# Patient Record
Sex: FEMALE | Race: WHITE | Employment: OTHER | URBAN - METROPOLITAN AREA
[De-identification: names, ages, dates, MRNs, and addresses within clinical notes are randomized per-mention and may not be internally consistent; named-entity substitution may affect disease eponyms.]

---

## 2019-05-09 ENCOUNTER — HOSPITAL ENCOUNTER (INPATIENT)
Age: 74
LOS: 3 days | Discharge: HOME HEALTH | DRG: 698 | End: 2019-05-12
Attending: EMERGENCY MEDICINE | Admitting: INTERNAL MEDICINE
Payer: MEDICARE

## 2019-05-09 ENCOUNTER — APPOINTMENT (OUTPATIENT)
Dept: CT IMAGING | Age: 74
DRG: 698 | End: 2019-05-09
Attending: EMERGENCY MEDICINE
Payer: MEDICARE

## 2019-05-09 ENCOUNTER — APPOINTMENT (OUTPATIENT)
Dept: GENERAL RADIOLOGY | Age: 74
DRG: 698 | End: 2019-05-09
Attending: EMERGENCY MEDICINE
Payer: MEDICARE

## 2019-05-09 DIAGNOSIS — N30.01 ACUTE CYSTITIS WITH HEMATURIA: Primary | ICD-10-CM

## 2019-05-09 DIAGNOSIS — R65.20 SEVERE SEPSIS (HCC): ICD-10-CM

## 2019-05-09 DIAGNOSIS — N17.9 AKI (ACUTE KIDNEY INJURY) (HCC): ICD-10-CM

## 2019-05-09 DIAGNOSIS — A41.9 SEVERE SEPSIS (HCC): ICD-10-CM

## 2019-05-09 PROBLEM — E78.5 HYPERLIPIDEMIA: Status: ACTIVE | Noted: 2019-05-09

## 2019-05-09 PROBLEM — E03.9 HYPOTHYROIDISM: Status: ACTIVE | Noted: 2019-05-09

## 2019-05-09 PROBLEM — E87.1 HYPONATREMIA: Status: ACTIVE | Noted: 2019-05-09

## 2019-05-09 PROBLEM — N39.0 UTI (URINARY TRACT INFECTION): Status: ACTIVE | Noted: 2019-05-09

## 2019-05-09 PROBLEM — I10 HTN (HYPERTENSION): Status: ACTIVE | Noted: 2019-05-09

## 2019-05-09 LAB
ALBUMIN SERPL-MCNC: 2.8 G/DL (ref 3.5–5)
ALBUMIN/GLOB SERPL: 0.6 {RATIO} (ref 1.1–2.2)
ALP SERPL-CCNC: 57 U/L (ref 45–117)
ALT SERPL-CCNC: 58 U/L (ref 12–78)
ANION GAP SERPL CALC-SCNC: 12 MMOL/L (ref 5–15)
APPEARANCE UR: ABNORMAL
AST SERPL-CCNC: 80 U/L (ref 15–37)
BACTERIA URNS QL MICRO: ABNORMAL /HPF
BASOPHILS # BLD: 0 K/UL (ref 0–0.1)
BASOPHILS NFR BLD: 0 % (ref 0–1)
BILIRUB SERPL-MCNC: 0.4 MG/DL (ref 0.2–1)
BILIRUB UR QL: NEGATIVE
BUN SERPL-MCNC: 60 MG/DL (ref 6–20)
BUN/CREAT SERPL: 26 (ref 12–20)
CALCIUM SERPL-MCNC: 8.3 MG/DL (ref 8.5–10.1)
CHLORIDE SERPL-SCNC: 95 MMOL/L (ref 97–108)
CO2 SERPL-SCNC: 22 MMOL/L (ref 21–32)
COLOR UR: ABNORMAL
CREAT SERPL-MCNC: 2.3 MG/DL (ref 0.55–1.02)
DIFFERENTIAL METHOD BLD: ABNORMAL
EOSINOPHIL # BLD: 0 K/UL (ref 0–0.4)
EOSINOPHIL NFR BLD: 0 % (ref 0–7)
EPITH CASTS URNS QL MICRO: ABNORMAL /LPF
ERYTHROCYTE [DISTWIDTH] IN BLOOD BY AUTOMATED COUNT: 12 % (ref 11.5–14.5)
GLOBULIN SER CALC-MCNC: 4.5 G/DL (ref 2–4)
GLUCOSE SERPL-MCNC: 99 MG/DL (ref 65–100)
GLUCOSE UR STRIP.AUTO-MCNC: NEGATIVE MG/DL
HCT VFR BLD AUTO: 34.6 % (ref 35–47)
HGB BLD-MCNC: 12.3 G/DL (ref 11.5–16)
HGB UR QL STRIP: ABNORMAL
IMM GRANULOCYTES # BLD AUTO: 0.2 K/UL (ref 0–0.04)
IMM GRANULOCYTES NFR BLD AUTO: 1 % (ref 0–0.5)
KETONES UR QL STRIP.AUTO: NEGATIVE MG/DL
LACTATE BLD-SCNC: 1.08 MMOL/L (ref 0.4–2)
LACTATE SERPL-SCNC: 1.1 MMOL/L (ref 0.4–2)
LEUKOCYTE ESTERASE UR QL STRIP.AUTO: ABNORMAL
LIPASE SERPL-CCNC: 82 U/L (ref 73–393)
LYMPHOCYTES # BLD: 0.6 K/UL (ref 0.8–3.5)
LYMPHOCYTES NFR BLD: 4 % (ref 12–49)
MCH RBC QN AUTO: 32.4 PG (ref 26–34)
MCHC RBC AUTO-ENTMCNC: 35.5 G/DL (ref 30–36.5)
MCV RBC AUTO: 91.1 FL (ref 80–99)
MONOCYTES # BLD: 1.1 K/UL (ref 0–1)
MONOCYTES NFR BLD: 7 % (ref 5–13)
NEUTS SEG # BLD: 13.3 K/UL (ref 1.8–8)
NEUTS SEG NFR BLD: 88 % (ref 32–75)
NITRITE UR QL STRIP.AUTO: NEGATIVE
NRBC # BLD: 0 K/UL (ref 0–0.01)
NRBC BLD-RTO: 0 PER 100 WBC
PH UR STRIP: 5 [PH] (ref 5–8)
PLATELET # BLD AUTO: 246 K/UL (ref 150–400)
PMV BLD AUTO: 10.5 FL (ref 8.9–12.9)
POTASSIUM SERPL-SCNC: 3.7 MMOL/L (ref 3.5–5.1)
PROT SERPL-MCNC: 7.3 G/DL (ref 6.4–8.2)
PROT UR STRIP-MCNC: 100 MG/DL
RBC # BLD AUTO: 3.8 M/UL (ref 3.8–5.2)
RBC #/AREA URNS HPF: ABNORMAL /HPF (ref 0–5)
RBC MORPH BLD: ABNORMAL
SODIUM SERPL-SCNC: 129 MMOL/L (ref 136–145)
SP GR UR REFRACTOMETRY: 1.01 (ref 1–1.03)
UA: UC IF INDICATED,UAUC: ABNORMAL
UROBILINOGEN UR QL STRIP.AUTO: 0.2 EU/DL (ref 0.2–1)
WBC # BLD AUTO: 15.2 K/UL (ref 3.6–11)
WBC URNS QL MICRO: ABNORMAL /HPF (ref 0–4)

## 2019-05-09 PROCEDURE — 65660000000 HC RM CCU STEPDOWN

## 2019-05-09 PROCEDURE — 96361 HYDRATE IV INFUSION ADD-ON: CPT

## 2019-05-09 PROCEDURE — 74011000258 HC RX REV CODE- 258: Performed by: EMERGENCY MEDICINE

## 2019-05-09 PROCEDURE — 83690 ASSAY OF LIPASE: CPT

## 2019-05-09 PROCEDURE — 81001 URINALYSIS AUTO W/SCOPE: CPT

## 2019-05-09 PROCEDURE — 87086 URINE CULTURE/COLONY COUNT: CPT

## 2019-05-09 PROCEDURE — 74011250636 HC RX REV CODE- 250/636: Performed by: INTERNAL MEDICINE

## 2019-05-09 PROCEDURE — 87077 CULTURE AEROBIC IDENTIFY: CPT

## 2019-05-09 PROCEDURE — 74176 CT ABD & PELVIS W/O CONTRAST: CPT

## 2019-05-09 PROCEDURE — 74011250636 HC RX REV CODE- 250/636: Performed by: EMERGENCY MEDICINE

## 2019-05-09 PROCEDURE — 71045 X-RAY EXAM CHEST 1 VIEW: CPT

## 2019-05-09 PROCEDURE — 80053 COMPREHEN METABOLIC PANEL: CPT

## 2019-05-09 PROCEDURE — 74011250637 HC RX REV CODE- 250/637: Performed by: INTERNAL MEDICINE

## 2019-05-09 PROCEDURE — 87186 SC STD MICRODIL/AGAR DIL: CPT

## 2019-05-09 PROCEDURE — 85025 COMPLETE CBC W/AUTO DIFF WBC: CPT

## 2019-05-09 PROCEDURE — 83605 ASSAY OF LACTIC ACID: CPT

## 2019-05-09 PROCEDURE — 36415 COLL VENOUS BLD VENIPUNCTURE: CPT

## 2019-05-09 PROCEDURE — 96365 THER/PROPH/DIAG IV INF INIT: CPT

## 2019-05-09 PROCEDURE — 96375 TX/PRO/DX INJ NEW DRUG ADDON: CPT

## 2019-05-09 PROCEDURE — 99285 EMERGENCY DEPT VISIT HI MDM: CPT

## 2019-05-09 PROCEDURE — 87040 BLOOD CULTURE FOR BACTERIA: CPT

## 2019-05-09 RX ORDER — LISINOPRIL AND HYDROCHLOROTHIAZIDE 12.5; 2 MG/1; MG/1
1 TABLET ORAL DAILY
COMMUNITY
End: 2019-05-12

## 2019-05-09 RX ORDER — SODIUM CHLORIDE 0.9 % (FLUSH) 0.9 %
5-40 SYRINGE (ML) INJECTION EVERY 8 HOURS
Status: DISCONTINUED | OUTPATIENT
Start: 2019-05-09 | End: 2019-05-12 | Stop reason: HOSPADM

## 2019-05-09 RX ORDER — ONDANSETRON 2 MG/ML
4 INJECTION INTRAMUSCULAR; INTRAVENOUS
Status: DISCONTINUED | OUTPATIENT
Start: 2019-05-09 | End: 2019-05-12 | Stop reason: HOSPADM

## 2019-05-09 RX ORDER — ACETAMINOPHEN 325 MG/1
650 TABLET ORAL
Status: DISCONTINUED | OUTPATIENT
Start: 2019-05-09 | End: 2019-05-09

## 2019-05-09 RX ORDER — SODIUM CHLORIDE 9 MG/ML
125 INJECTION, SOLUTION INTRAVENOUS CONTINUOUS
Status: DISCONTINUED | OUTPATIENT
Start: 2019-05-09 | End: 2019-05-12

## 2019-05-09 RX ORDER — LEVOTHYROXINE SODIUM 100 UG/1
100 TABLET ORAL
COMMUNITY

## 2019-05-09 RX ORDER — CEFUROXIME AXETIL 250 MG/1
250 TABLET ORAL 2 TIMES DAILY
COMMUNITY
End: 2019-05-12

## 2019-05-09 RX ORDER — SODIUM CHLORIDE 0.9 % (FLUSH) 0.9 %
5-40 SYRINGE (ML) INJECTION AS NEEDED
Status: DISCONTINUED | OUTPATIENT
Start: 2019-05-09 | End: 2019-05-12 | Stop reason: HOSPADM

## 2019-05-09 RX ORDER — ONDANSETRON 2 MG/ML
4 INJECTION INTRAMUSCULAR; INTRAVENOUS
Status: COMPLETED | OUTPATIENT
Start: 2019-05-09 | End: 2019-05-09

## 2019-05-09 RX ORDER — LISINOPRIL 20 MG/1
TABLET ORAL DAILY
COMMUNITY
End: 2019-05-09 | Stop reason: CLARIF

## 2019-05-09 RX ORDER — SIMVASTATIN 40 MG/1
40 TABLET, FILM COATED ORAL
COMMUNITY

## 2019-05-09 RX ORDER — HEPARIN SODIUM 5000 [USP'U]/ML
5000 INJECTION, SOLUTION INTRAVENOUS; SUBCUTANEOUS EVERY 8 HOURS
Status: DISCONTINUED | OUTPATIENT
Start: 2019-05-09 | End: 2019-05-12 | Stop reason: HOSPADM

## 2019-05-09 RX ORDER — LEVOTHYROXINE SODIUM 100 UG/1
100 TABLET ORAL
Status: DISCONTINUED | OUTPATIENT
Start: 2019-05-10 | End: 2019-05-12 | Stop reason: HOSPADM

## 2019-05-09 RX ORDER — PRAVASTATIN SODIUM 40 MG/1
80 TABLET ORAL
Status: DISCONTINUED | OUTPATIENT
Start: 2019-05-09 | End: 2019-05-12 | Stop reason: HOSPADM

## 2019-05-09 RX ORDER — ACETAMINOPHEN 325 MG/1
650 TABLET ORAL
Status: DISCONTINUED | OUTPATIENT
Start: 2019-05-09 | End: 2019-05-12 | Stop reason: HOSPADM

## 2019-05-09 RX ADMIN — ONDANSETRON 4 MG: 2 INJECTION INTRAMUSCULAR; INTRAVENOUS at 19:48

## 2019-05-09 RX ADMIN — Medication 10 ML: at 22:21

## 2019-05-09 RX ADMIN — SODIUM CHLORIDE 125 ML/HR: 900 INJECTION, SOLUTION INTRAVENOUS at 22:25

## 2019-05-09 RX ADMIN — SODIUM CHLORIDE 851 ML: 900 INJECTION, SOLUTION INTRAVENOUS at 20:24

## 2019-05-09 RX ADMIN — SODIUM CHLORIDE 1000 ML: 900 INJECTION, SOLUTION INTRAVENOUS at 19:49

## 2019-05-09 RX ADMIN — CEFTRIAXONE 1 G: 1 INJECTION, POWDER, FOR SOLUTION INTRAMUSCULAR; INTRAVENOUS at 19:49

## 2019-05-09 RX ADMIN — PRAVASTATIN SODIUM 80 MG: 40 TABLET ORAL at 22:19

## 2019-05-09 RX ADMIN — HEPARIN SODIUM 5000 UNITS: 5000 INJECTION INTRAVENOUS; SUBCUTANEOUS at 22:21

## 2019-05-09 NOTE — ED NOTES
Care assumed of patient @ this time & intro with rounding done, with report from __Adiel & MOISÉS Gunn_________________. Patient resting quietly on stretcher without verbal complaints.

## 2019-05-09 NOTE — ED PROVIDER NOTES
EMERGENCY DEPARTMENT HISTORY AND PHYSICAL EXAM      Date: 5/9/2019  Patient Name: Shanique Davidson  Patient Age and Sex: 75 y.o. female     History of Presenting Illness     Chief Complaint   Patient presents with    Fatigue     pt had bladder surgery on 4/29, had catheter and stent removed on Tuesday, pt has been fatigue, fever off and on, weakness, nausea, vomiting    Nausea    Vomiting       History Provided By: Patient    HPI: Shanique Davidson is a 70-year-old female with a history of bladder cancer status post surgery presenting for fever. Patient states that she had bladder surgery on April 29 and had a catheter and stent placed then. She had the Pa catheter and stent removed 2 days ago. Since yesterday has been having intermittent fevers, the highest 101.7, associated with generalized weakness, nausea, vomiting and poor appetite. Patient states that she has not taken anything for the fevers at home. Denies any abdominal pain, back pain, dysuria, hematuria. She called Dr. Felix Ayoub office who recommended she come to the ER. There are no other complaints, changes, or physical findings at this time. PCP: Other, MD [facility]    No current facility-administered medications on file prior to encounter. Current Outpatient Medications on File Prior to Encounter   Medication Sig Dispense Refill    simvastatin (ZOCOR) 40 mg tablet Take 40 mg by mouth nightly.  levothyroxine (SYNTHROID) 100 mcg tablet Take 100 mcg by mouth Daily (before breakfast).  cefUROXime (CEFTIN) 250 mg tablet Take 250 mg by mouth two (2) times a day.  lisinopril-hydroCHLOROthiazide (PRINZIDE, ZESTORETIC) 20-12.5 mg per tablet Take 1 Tab by mouth daily. Past History     Past Medical History:  No past medical history on file. Past Surgical History:  No past surgical history on file. Family History:  No family history on file.     Social History:  Social History     Tobacco Use    Smoking status: Not on file   Substance Use Topics    Alcohol use: Not on file    Drug use: Not on file       Allergies: Allergies   Allergen Reactions    Sulfa (Sulfonamide Antibiotics) Rash         Review of Systems   Review of Systems   Constitutional: Positive for appetite change, fatigue and fever. Negative for chills. Respiratory: Positive for cough. Negative for shortness of breath. Mild cough few days   Cardiovascular: Negative for chest pain. Gastrointestinal: Positive for nausea and vomiting. Negative for abdominal pain, constipation and diarrhea. Genitourinary: Negative for difficulty urinating, dysuria, flank pain and frequency. Neurological: Negative for weakness and numbness. All other systems reviewed and are negative. Physical Exam   Physical Exam   Constitutional: She is oriented to person, place, and time. She appears well-developed and well-nourished. HENT:   Head: Normocephalic and atraumatic. Very dry membranes   Eyes: Conjunctivae and EOM are normal.   Neck: Normal range of motion. Neck supple. Cardiovascular: Normal rate and regular rhythm. Pulmonary/Chest: Effort normal and breath sounds normal. No respiratory distress. Abdominal: Soft. She exhibits no distension. There is no tenderness. There is no rebound and no guarding. No cva tenderness   Musculoskeletal: Normal range of motion. Neurological: She is alert and oriented to person, place, and time. Skin: Skin is warm and dry. Psychiatric: She has a normal mood and affect. Nursing note and vitals reviewed.        Diagnostic Study Results     Labs -     Recent Results (from the past 12 hour(s))   URINALYSIS W/ REFLEX CULTURE    Collection Time: 05/09/19  7:19 PM   Result Value Ref Range    Color YELLOW/STRAW      Appearance TURBID (A) CLEAR      Specific gravity 1.013 1.003 - 1.030      pH (UA) 5.0 5.0 - 8.0      Protein 100 (A) NEG mg/dL    Glucose NEGATIVE  NEG mg/dL    Ketone NEGATIVE  NEG mg/dL    Bilirubin NEGATIVE  NEG      Blood LARGE (A) NEG      Urobilinogen 0.2 0.2 - 1.0 EU/dL    Nitrites NEGATIVE  NEG      Leukocyte Esterase LARGE (A) NEG      WBC  0 - 4 /hpf    RBC 10-20 0 - 5 /hpf    Epithelial cells MODERATE (A) FEW /lpf    Bacteria 3+ (A) NEG /hpf    UA:UC IF INDICATED URINE CULTURE ORDERED (A) CNI     CBC WITH AUTOMATED DIFF    Collection Time: 05/09/19  7:41 PM   Result Value Ref Range    WBC 15.2 (H) 3.6 - 11.0 K/uL    RBC 3.80 3.80 - 5.20 M/uL    HGB 12.3 11.5 - 16.0 g/dL    HCT 34.6 (L) 35.0 - 47.0 %    MCV 91.1 80.0 - 99.0 FL    MCH 32.4 26.0 - 34.0 PG    MCHC 35.5 30.0 - 36.5 g/dL    RDW 12.0 11.5 - 14.5 %    PLATELET 861 245 - 976 K/uL    MPV 10.5 8.9 - 12.9 FL    NRBC 0.0 0  WBC    ABSOLUTE NRBC 0.00 0.00 - 0.01 K/uL    NEUTROPHILS 88 (H) 32 - 75 %    LYMPHOCYTES 4 (L) 12 - 49 %    MONOCYTES 7 5 - 13 %    EOSINOPHILS 0 0 - 7 %    BASOPHILS 0 0 - 1 %    IMMATURE GRANULOCYTES 1 (H) 0.0 - 0.5 %    ABS. NEUTROPHILS 13.3 (H) 1.8 - 8.0 K/UL    ABS. LYMPHOCYTES 0.6 (L) 0.8 - 3.5 K/UL    ABS. MONOCYTES 1.1 (H) 0.0 - 1.0 K/UL    ABS. EOSINOPHILS 0.0 0.0 - 0.4 K/UL    ABS. BASOPHILS 0.0 0.0 - 0.1 K/UL    ABS. IMM. GRANS. 0.2 (H) 0.00 - 0.04 K/UL    DF AUTOMATED      RBC COMMENTS NORMOCYTIC, NORMOCHROMIC     METABOLIC PANEL, COMPREHENSIVE    Collection Time: 05/09/19  7:41 PM   Result Value Ref Range    Sodium 129 (L) 136 - 145 mmol/L    Potassium 3.7 3.5 - 5.1 mmol/L    Chloride 95 (L) 97 - 108 mmol/L    CO2 22 21 - 32 mmol/L    Anion gap 12 5 - 15 mmol/L    Glucose 99 65 - 100 mg/dL    BUN 60 (H) 6 - 20 MG/DL    Creatinine 2.30 (H) 0.55 - 1.02 MG/DL    BUN/Creatinine ratio 26 (H) 12 - 20      GFR est AA 25 (L) >60 ml/min/1.73m2    GFR est non-AA 21 (L) >60 ml/min/1.73m2    Calcium 8.3 (L) 8.5 - 10.1 MG/DL    Bilirubin, total 0.4 0.2 - 1.0 MG/DL    ALT (SGPT) 58 12 - 78 U/L    AST (SGOT) 80 (H) 15 - 37 U/L    Alk.  phosphatase 57 45 - 117 U/L    Protein, total 7.3 6.4 - 8.2 g/dL    Albumin 2.8 (L) 3.5 - 5.0 g/dL    Globulin 4.5 (H) 2.0 - 4.0 g/dL    A-G Ratio 0.6 (L) 1.1 - 2.2     LIPASE    Collection Time: 05/09/19  7:41 PM   Result Value Ref Range    Lipase 82 73 - 393 U/L   LACTIC ACID    Collection Time: 05/09/19  7:41 PM   Result Value Ref Range    Lactic acid 1.1 0.4 - 2.0 MMOL/L   POC LACTIC ACID    Collection Time: 05/09/19  7:47 PM   Result Value Ref Range    Lactic Acid (POC) 1.08 0.40 - 2.00 mmol/L       Radiologic Studies -   XR CHEST PORT   Final Result   Impression:   No acute cardiopulmonary process. CT ABD PELV WO CONT    (Results Pending)     CT Results  (Last 48 hours)    None        CXR Results  (Last 48 hours)               05/09/19 1927  XR CHEST PORT Final result    Impression:  Impression:   No acute cardiopulmonary process. Narrative:  Chest portable AP       History: Cough and fever       Comparison: None       Findings: The lungs are well expanded. No focal consolidation, pleural   effusion, or pneumothorax. The cardiomediastinal silhouette is unremarkable. The visualized osseous structures are unremarkable. Medical Decision Making   I am the first provider for this patient. I reviewed the vital signs, available nursing notes, past medical history, past surgical history, family history and social history. Vital Signs-Reviewed the patient's vital signs.   Patient Vitals for the past 12 hrs:   Temp Pulse Resp BP SpO2   05/09/19 2100 -- -- -- 104/50 97 %   05/09/19 2045 -- -- -- 102/50 97 %   05/09/19 2038 -- -- -- 101/49 98 %   05/09/19 2030 -- -- -- 101/49 97 %   05/09/19 2000 -- -- -- 95/49 96 %   05/09/19 1945 -- -- -- 91/49 96 %   05/09/19 1930 -- -- -- 93/49 96 %   05/09/19 1920 -- -- -- (!) 87/51 97 %   05/09/19 1902 -- -- -- -- 95 %   05/09/19 1901 -- -- -- 109/52 --   05/09/19 1900 -- 71 16 109/52 97 %   05/09/19 1850 98.2 °F (36.8 °C) 85 16 98/60 97 %       Records Reviewed: Nursing Notes and Old Medical Records    Provider Notes (Medical Decision Making):   Patient presenting for fever since yesterday in addition to nausea, vomiting, fatigue. Noted to be slightly hypotensive here initially with systolics at 98 and improved to 109. Concern for urinary tract infection given her history versus pneumonia given the cough. We will get sepsis work-up though patient is not febrile or tachycardic here and treat with antibiotics as needed. ED Course:   Initial assessment performed. The patients presenting problems have been discussed, and they are in agreement with the care plan formulated and outlined with them. I have encouraged them to ask questions as they arise throughout their visit. ED Course as of May 09 2119   Thu May 09, 2019   [address] Nurse informed me that patient's urine appears cloudy and she is hypotensive. We will give her the 30 cc/kg bolus and given ceftriaxone for likely UTI. [JS]   2101 Spoke with Dr. Isela Cardenas, urology who stated CT is unlikely to show any surgical problem. Advised admitting to hospitalist and Dr. You Hutchison will see her in the morning. [JS]      ED Course User Index  [JS] Dennis Frye MD     9:20 PM - I suspect that this patient has an active infection. 9:20 PM - The patient met criteria for severe sepsis at this time.       PROVIDER SEPSIS PHYSICAL EXAM EVAL  Vital signs reviewed (see nursing documentation for further details):  Vitals:    05/09/19 2030 05/09/19 2038 05/09/19 2045 05/09/19 2100   BP: 101/49 101/49 102/50 104/50   Pulse:       Resp:       Temp:       SpO2: 97% 98% 97% 97%       Cardiac exam:Regular Rate    Pulmonary exam:Normal    Peripheral pulses:Normal    Capillary refill:Normal    Skin exam:pink    Exam performed Eugene Spviey MD      Critical Care Time:   CRITICAL CARE NOTE :    9:20 PM    IMPENDING DETERIORATION -Cardiovascular  ASSOCIATED RISK FACTORS - Hypotension and Shock  MANAGEMENT- Bedside Assessment and Supervision of Care  INTERPRETATION -  CT Scan, ECG, Blood Pressure and Cardiac Output Measures   INTERVENTIONS - hemodynamic mngmt  CASE REVIEW - Hospitalist, Medical Sub-Specialist, Nursing and Family  TREATMENT RESPONSE -Improved  PERFORMED BY - Self    NOTES   :    I have spent 50 minutes of critical care time involved in lab review, consultations with specialist, family decision- making, bedside attention and documentation. During this entire length of time I was immediately available to the patient . Disposition:    Admission Note:  Patient is being admitted to the hospital by Dr. Meaghan Meneses, Service: Hospitalist.  The results of their tests and reasons for their admission have been discussed with them and available family. They convey agreement and understanding for the need to be admitted and for their admission diagnosis. PLAN:  1. Current Discharge Medication List        2. Follow-up Information    None       3. Return to ED if worse     Diagnosis     Clinical Impression:   1. Acute cystitis with hematuria    2. Severe sepsis ([address]    3. URVASHI (acute kidney injury) ([facility] [address]        Attestations:    Carol Guevara M.D. Please note that this dictation was completed with [employer], the computer voice recognition software. Quite often unanticipated grammatical, syntax, homophones, and other interpretive errors are inadvertently transcribed by the computer software. Please disregard these errors. Please excuse any errors that have escaped final proofreading. Thank you.

## 2019-05-09 NOTE — ED NOTES
edside shift change report given to Bev(oncoming nurse) by Hailey Galicia (offgoing nurse). Report included the following information SBAR, Kardex and MAR. Night RN aware that LA and PBC need to be drawn.

## 2019-05-10 LAB
ALBUMIN SERPL-MCNC: 2.3 G/DL (ref 3.5–5)
ALBUMIN/GLOB SERPL: 0.6 {RATIO} (ref 1.1–2.2)
ALP SERPL-CCNC: 49 U/L (ref 45–117)
ALT SERPL-CCNC: 91 U/L (ref 12–78)
ANION GAP SERPL CALC-SCNC: 8 MMOL/L (ref 5–15)
AST SERPL-CCNC: 122 U/L (ref 15–37)
BASOPHILS # BLD: 0 K/UL (ref 0–0.1)
BASOPHILS NFR BLD: 0 % (ref 0–1)
BILIRUB SERPL-MCNC: 0.3 MG/DL (ref 0.2–1)
BUN SERPL-MCNC: 52 MG/DL (ref 6–20)
BUN/CREAT SERPL: 31 (ref 12–20)
CALCIUM SERPL-MCNC: 7.5 MG/DL (ref 8.5–10.1)
CHLORIDE SERPL-SCNC: 108 MMOL/L (ref 97–108)
CO2 SERPL-SCNC: 20 MMOL/L (ref 21–32)
CREAT SERPL-MCNC: 1.69 MG/DL (ref 0.55–1.02)
DIFFERENTIAL METHOD BLD: ABNORMAL
EOSINOPHIL # BLD: 0 K/UL (ref 0–0.4)
EOSINOPHIL NFR BLD: 0 % (ref 0–7)
ERYTHROCYTE [DISTWIDTH] IN BLOOD BY AUTOMATED COUNT: 12 % (ref 11.5–14.5)
GLOBULIN SER CALC-MCNC: 3.9 G/DL (ref 2–4)
GLUCOSE SERPL-MCNC: 117 MG/DL (ref 65–100)
HCT VFR BLD AUTO: 33.1 % (ref 35–47)
HGB BLD-MCNC: 11.3 G/DL (ref 11.5–16)
IMM GRANULOCYTES # BLD AUTO: 0.1 K/UL (ref 0–0.04)
IMM GRANULOCYTES NFR BLD AUTO: 1 % (ref 0–0.5)
LYMPHOCYTES # BLD: 0.6 K/UL (ref 0.8–3.5)
LYMPHOCYTES NFR BLD: 5 % (ref 12–49)
MCH RBC QN AUTO: 31.9 PG (ref 26–34)
MCHC RBC AUTO-ENTMCNC: 34.1 G/DL (ref 30–36.5)
MCV RBC AUTO: 93.5 FL (ref 80–99)
MONOCYTES # BLD: 1.2 K/UL (ref 0–1)
MONOCYTES NFR BLD: 9 % (ref 5–13)
NEUTS SEG # BLD: 10.7 K/UL (ref 1.8–8)
NEUTS SEG NFR BLD: 85 % (ref 32–75)
NRBC # BLD: 0 K/UL (ref 0–0.01)
NRBC BLD-RTO: 0 PER 100 WBC
PLATELET # BLD AUTO: 210 K/UL (ref 150–400)
PMV BLD AUTO: 10.4 FL (ref 8.9–12.9)
POTASSIUM SERPL-SCNC: 3.6 MMOL/L (ref 3.5–5.1)
PROT SERPL-MCNC: 6.2 G/DL (ref 6.4–8.2)
RBC # BLD AUTO: 3.54 M/UL (ref 3.8–5.2)
SODIUM SERPL-SCNC: 136 MMOL/L (ref 136–145)
WBC # BLD AUTO: 12.6 K/UL (ref 3.6–11)

## 2019-05-10 PROCEDURE — 80053 COMPREHEN METABOLIC PANEL: CPT

## 2019-05-10 PROCEDURE — 74011250637 HC RX REV CODE- 250/637: Performed by: INTERNAL MEDICINE

## 2019-05-10 PROCEDURE — 74011000258 HC RX REV CODE- 258: Performed by: INTERNAL MEDICINE

## 2019-05-10 PROCEDURE — 74011250636 HC RX REV CODE- 250/636: Performed by: INTERNAL MEDICINE

## 2019-05-10 PROCEDURE — 65660000000 HC RM CCU STEPDOWN

## 2019-05-10 PROCEDURE — 85025 COMPLETE CBC W/AUTO DIFF WBC: CPT

## 2019-05-10 PROCEDURE — 36415 COLL VENOUS BLD VENIPUNCTURE: CPT

## 2019-05-10 RX ADMIN — Medication 10 ML: at 14:19

## 2019-05-10 RX ADMIN — CEFTRIAXONE 1 G: 1 INJECTION, POWDER, FOR SOLUTION INTRAMUSCULAR; INTRAVENOUS at 21:17

## 2019-05-10 RX ADMIN — SODIUM CHLORIDE 125 ML/HR: 900 INJECTION, SOLUTION INTRAVENOUS at 21:21

## 2019-05-10 RX ADMIN — LEVOTHYROXINE SODIUM 100 MCG: 100 TABLET ORAL at 07:16

## 2019-05-10 RX ADMIN — SODIUM CHLORIDE 125 ML/HR: 900 INJECTION, SOLUTION INTRAVENOUS at 04:42

## 2019-05-10 RX ADMIN — PRAVASTATIN SODIUM 80 MG: 40 TABLET ORAL at 21:15

## 2019-05-10 RX ADMIN — HEPARIN SODIUM 5000 UNITS: 5000 INJECTION INTRAVENOUS; SUBCUTANEOUS at 07:18

## 2019-05-10 RX ADMIN — HEPARIN SODIUM 5000 UNITS: 5000 INJECTION INTRAVENOUS; SUBCUTANEOUS at 21:14

## 2019-05-10 RX ADMIN — Medication 10 ML: at 22:00

## 2019-05-10 RX ADMIN — HEPARIN SODIUM 5000 UNITS: 5000 INJECTION INTRAVENOUS; SUBCUTANEOUS at 14:17

## 2019-05-10 RX ADMIN — Medication 10 ML: at 07:16

## 2019-05-10 NOTE — PROGRESS NOTES
Bedside shift change report given to Farrah Mcrae (oncoming nurse) by [employer] (offgoing nurse). Report included the following information SBAR, Kardex, Procedure Summary, Intake/Output, MAR, Recent Results and Cardiac Rhythm NSR/SB.

## 2019-05-10 NOTE — PROGRESS NOTES
Hospitalist Progress Note    NAME: Moreno Cruz   :  1945   MRN:  [id number]       Assessment / Plan:  Sepsis with leukocytosis, hypotension with SBP 87/51 and fever of 101.7 at home POA  Due to complicated UTI in settings of recent bladder tumor resection, stents placement then removal  IV Rocephin  F/u urine and blood cultures  Seen by urology. For now plan to treat with abx and monitor crt.     URVASHI  Hyponatremia  IVF and monitor lytes     HTN (hypertension)   Hold BP meds as was hypotensive and now Bp borderline  PRN nitropaste     Hypothyroidism   Continue synthroid     Hyperlipidemia   Continue statins     Code Status: Full  Surrogate Decision Maker: Daughter     DVT Prophylaxis: Lovenox     Baseline: functional     Subjective:     Chief Complaint / Reason for Physician Visit  \"no reports of pain or chills. \". Discussed with RN events overnight. Review of Systems:  Symptom Y/N Comments  Symptom Y/N Comments   Fever/Chills    Chest Pain     Poor Appetite    Edema     Cough    Abdominal Pain     Sputum    Joint Pain     SOB/NICHOLSON    Pruritis/Rash     Nausea/vomit    Tolerating PT/OT     Diarrhea    Tolerating Diet     Constipation    Other       Could NOT obtain due to:      Objective:     VITALS:   Last 24hrs VS reviewed since prior progress note.  Most recent are:  Patient Vitals for the past 24 hrs:   Temp Pulse Resp BP SpO2   05/10/19 0841 96.7 °F (35.9 °C) 70 16 (!) 94/37 98 %   19 2306 98 °F (36.7 °C) (!) 55 16 111/48 97 %   19 -- -- -- 105/44 99 %   19 -- (!) 58 18 107/48 98 %   19 -- -- -- 93/52 97 %   19 -- -- -- -- 96 %   19 -- -- -- 97/47 --   19 -- -- -- 104/50 97 %   19 -- -- -- 102/50 97 %   19 -- -- -- 101/49 98 %   19 -- -- -- 101/49 97 %   19 -- -- -- 95/49 96 %   19 -- -- -- / 96 %   19 -- -- -- / 96 %   19 -- -- -- (!) 87/51 97 %   05/09/19 1902 -- -- -- -- 95 %   05/09/19 1901 -- -- -- 109/52 --   05/09/19 1900 -- 71 16 109/52 97 %   05/09/19 1850 98.2 °F (36.8 °C) 85 16 98/60 97 %       Intake/Output Summary (Last 24 hours) at 5/10/2019 1012  Last data filed at 5/10/2019 0841  Gross per 24 hour   Intake 1523.33 ml   Output 350 ml   Net 1173.33 ml        PHYSICAL EXAM:  General: WD, WN. Alert, cooperative, no acute distress    EENT:  EOMI. Anicteric sclerae. MMM  Resp:  CTA bilaterally, no wheezing or rales. No accessory muscle use  CV:  Regular  rhythm,  No edema  GI:  Soft, Non distended, Non tender.  +Bowel sounds  Neurologic:  Alert and oriented X 3, normal speech,   Psych:   Good insight. Not anxious nor agitated  Skin:  No rashes. No jaundice    Reviewed most current lab test results and cultures  YES  Reviewed most current radiology test results   YES  Review and summation of old records today    NO  Reviewed patient's current orders and MAR    YES  PMH/ reviewed - no change compared to H&P  ________________________________________________________________________  Care Plan discussed with:    Comments   Patient     Family      RN     Care Manager     Consultant                        Multidiciplinary team rounds were held today with , nursing, pharmacist and clinical coordinator. Patient's plan of care was discussed; medications were reviewed and discharge planning was addressed. ________________________________________________________________________  Total NON critical care TIME:  30   Minutes    Total CRITICAL CARE TIME Spent:   Minutes non procedure based      Comments   >50% of visit spent in counseling and coordination of care     ________________________________________________________________________  Stephanie Do, DO     Procedures: see electronic medical records for all procedures/Xrays and details which were not copied into this note but were reviewed prior to creation of Plan.       LABS:  I reviewed today's most current labs and imaging studies.   Pertinent labs include:  Recent Labs     05/10/19  0436 05/09/19 1941   WBC 12.6* 15.2*   HGB 11.3* 12.3   HCT 33.1* 34.6*    246     Recent Labs     05/10/19  0436 05/09/19  1941    129*   K 3.6 3.7    95*   CO2 20* 22   * 99   BUN 52* 60*   CREA 1.69* 2.30*   CA 7.5* 8.3*   ALB 2.3* 2.8*   TBILI 0.3 0.4   SGOT 122* 80*   ALT 91* 58       Signed: Gal Gonzalez, DO

## 2019-05-10 NOTE — PROGRESS NOTES
Pharmacy Clarification of Prior to Admission Medication Regimen     The patient was interviewed regarding clarification of the prior to admission medication regimen and was questioned regarding use of any other inhalers, topical products, over the counter medications, herbal medications, vitamin products or ophthalmic/nasal/otic medication use. Information Obtained From: Patient, prescription bottles    Pertinent Pharmacy Findings:  cefUROXime (CEFTIN) 250 mg tablet: Patient started a 3 day regimen on 5/7/19. Patient has completed 2 days and 1 dose of therapy as of 5/9/19. Antibiotic Indication: post catheter/stint removal    PTA medication list was corrected to the following:     Prior to Admission Medications   Prescriptions Last Dose Informant Patient Reported? Taking? cefUROXime (CEFTIN) 250 mg tablet 5/9/2019 at Unknown time Other Yes Yes   Sig: Take 250 mg by mouth two (2) times a day. levothyroxine (SYNTHROID) 100 mcg tablet 5/9/2019 at Unknown time Other Yes Yes   Sig: Take 100 mcg by mouth Daily (before breakfast). lisinopril-hydroCHLOROthiazide (PRINZIDE, ZESTORETIC) 20-12.5 mg per tablet 5/9/2019 at Unknown time Other Yes Yes   Sig: Take 1 Tab by mouth daily. simvastatin (ZOCOR) 40 mg tablet 5/8/2019 at Unknown time Other Yes Yes   Sig: Take 40 mg by mouth nightly.       Facility-Administered Medications: None          Thank you,  Shira Cook CPhT  Medication History Pharmacy Technician

## 2019-05-10 NOTE — ROUTINE PROCESS
Bedside and Verbal shift change report given to Gregoria Abbasi RN (oncoming nurse) by Austin Villanueva RN (offgoing nurse). Report included the following information SBAR.

## 2019-05-10 NOTE — INTERDISCIPLINARY ROUNDS
Oncology Interdisciplinary rounds were held today to discuss patient plan of care and outcomes.  The following members were present: Nursing, Physician, Case Management, Pharmacy, and PT/OT    Actual Length of Stay: 1         Expected Length of Stay: - - -                       Plan            Discharge    Pain management Home self care

## 2019-05-10 NOTE — CONSULTS
[facility]    Name:  Jorge Kaur  MR#:  [id number]  :  1945  ACCOUNT #:  [de-identified]  DATE OF SERVICE:  05/10/2019      REASON FOR CONSULTATION:  Urosepsis. HISTORY OF PRESENT ILLNESS:  This is a 67-year-old white female, patient of Dr. Halley Stevens, whose daughter is present. She recently had a TURB and ureteral stent by Dr. 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 Despite oral Ceftin, she developed malaise and fevers to 101.7 and was directed to the emergency room where she has been admitted on the urosepsis protocol. She has been cultured and these are pending. She has had IV fluids and ceftriaxone. She already feels better and is afebrile with hypotension resolved now. Her white count went down from 15.2 to 12.6 this morning with hyponatremia resolved and creatinine decreasing from 2.3 to 1.69. Her lactic acid yesterday was 1.1. The CT which I personally reviewed demonstrate some expected postsurgical changes in the bladder and some mild right hydronephrosis with some wall thickening and very mild hydronephrosis with perinephric stranding consistent with pyelonephritis. PAST MEDICAL HISTORY:  Includes hypertension, hypothyroidism, hyperlipidemia. MEDICATIONS:  Are on the chart and were reviewed. ALLERGIES:  SHE IS ALLERGIC TO SULFA. SOCIAL HISTORY:  She is a smoker and . FAMILY HISTORY:  Positive for hypertension. REVIEW OF SYSTEMS:  Per HPI. She is having some mild urge incontinence. PHYSICAL EXAMINATION:  GENERAL:  She is alert and oriented, in no apparent distress. Afebrile. SKIN:  Cool to touch. She has no jaundice. NECK:  Supple. BACK:  She has no CVA or abdominal tenderness. EXTREMITIES:  Without edema.   NEURO:  Grossly nonfocal.  PSYCH:  Normal.    IMPRESSION:  Postoperative pyelonephritis with apparent urosepsis and mild renal insufficiency and electrolyte abnormalities, improving. As her organism will probably be resistant to the Ceftin she was on, careful followup of urine cultures and directed antibiotics will be necessary. She does appear to be responding to ceftriaxone, I will continue that. She does not need any surgical intervention. She does have urologic followup. Therefore, I agree with her current management and would ask to be recontacted if there are any adverse changes for which urologic specific help is desired.         Farhad Zuniga MD      [id number]  D:  05/10/2019 7:45  T:  05/10/2019 8:28  JOB #:  [id number]

## 2019-05-10 NOTE — PROGRESS NOTES
TRANSFER - IN REPORT:    Verbal report received from Brandt Palafox RN(name) on Ha  being received from ED(unit) for routine progression of care      Report consisted of patients Situation, Background, Assessment and   Recommendations(SBAR). Information from the following report(s) SBAR, Kardex, ED Summary, Intake/Output, MAR, Recent Results and Med Rec Status was reviewed with the receiving nurse. Opportunity for questions and clarification was provided. Assessment completed upon patients arrival to unit and care assumed.

## 2019-05-10 NOTE — ACP (ADVANCE CARE PLANNING)
Request by peter to assist with Advance Medical Directive (AMD) in Oncology. Consulted with patients nurse. Explained document to patient and left paperwork to be completed at a later time per patient's request. Patient and  discussed end of life topics as well as theology. Plan of care is to follow up with patient as needed. [address] Paging Service [phone]

## 2019-05-10 NOTE — H&P
Hospitalist Admission Note    NAME: Wyatt Murcia   :  1945   MRN:  [id number]     Date/Time:  2019 9:55 PM    Patient PCP: Mart Bone MD  ______________________________________________________________________  Given the patient's current clinical presentation, I have a high level of concern for decompensation if discharged from the emergency department. Complex decision making was performed, which includes reviewing the patient's available past medical records, laboratory results, and x-ray films. My assessment of this patient's clinical condition and my plan of care is as follows. Assessment / Plan:  Sepsis with leukocytosis, hypotension with SBP 87/51 and fever of 101.7 at home POA  Due to complicated UTI in settings of recent bladder tumor resection, stents placement then removal  Admit to tele  IVF  IV Rocephin  F/u urine and blood cultures  IVF  ED consulted oncall urology, awaiting formal input  CT A/P with Bilateral nephrolithiasis. There is urothelial thickening from the right renal collecting system to the bladder. There is stranding adjacent to the right kidney and right ureter suggesting inflammation. There are punctate calculi in the anterior bladder of uncertain significance, possibly calculi. Query eccentric bladder wall thickening anteriorly on the right. Recommend urologic follow-up. Incidental findings as above    URVASHI  Hyponatremia  IVF and monitor lytes    HTN (hypertension)   Hold BP meds as was hypotensive and now Bp borderline  PRN nitropaste    Hypothyroidism   Continue synthroid    Hyperlipidemia   Continue statins    Code Status: Full  Surrogate Decision Maker: Daughter    DVT Prophylaxis: Lovenox    Baseline: functional      Subjective:   CHIEF COMPLAINT: fevers, nausea, vomiting    HISTORY OF PRESENT ILLNESS:     Wyatt Murcia is a 75 y.o.  female who presents with fevers and chills.  As per patient, she had surgery for bladder tumor in end of April and also noted to have kidney stones and stents were place which now removed day before yesterday. Pt reported nausea and vomiting x2 episode on the days of surgery and some diarrhea like bowel movement but no more diarrhea since yesterday after taking immodium. Pt reported fever upto 101.7 at home associated with chills yesterday. She called her urologist Dr Luan Tom who recommend her to come to ED for evaluation. She denies any chest pain, cough, problems urination, abdominal pain. In ED pt noted to be hypotensive, leukocytosis, UTi on UA, elevated Creatinine & CT A/P with Bilateral nephrolithiasis. There is urothelial thickening from the right renal collecting system to the bladder. There is stranding adjacent to the right kidney and right ureter suggesting inflammation. There are punctate calculi in the anterior bladder of uncertain significance, possibly calculi. Query eccentric bladder wall thickening anteriorly on the right. Recommend urologic follow-up. Incidental findings as above    We were asked to admit for work up and evaluation of the above problems. Past medical history: HTN, Hypothyroidism, Hyperlipidemia     Past surgical history: bladder surgery    Social History     Tobacco Use    Smoking status: yes   Substance Use Topics    Alcohol use: Denies abuse        Family history: positive for HTn in the family    Allergies   Allergen Reactions    Sulfa (Sulfonamide Antibiotics) Rash        Prior to Admission medications    Medication Sig Start Date End Date Taking? Authorizing Provider   simvastatin (ZOCOR) 40 mg tablet Take 40 mg by mouth nightly. Yes Smitha, MD Mart   levothyroxine (SYNTHROID) 100 mcg tablet Take 100 mcg by mouth Daily (before breakfast). Yes Smitha, MD Mart   cefUROXime (CEFTIN) 250 mg tablet Take 250 mg by mouth two (2) times a day. Yes Smitha, MD Mart   lisinopril-hydroCHLOROthiazide (PRINZIDE, ZESTORETIC) 20-12.5 mg per tablet Take 1 Tab by mouth daily. Yes Provider, Historical       REVIEW OF SYSTEMS:     I am not able to complete the review of systems because: The patient is intubated and sedated    The patient has altered mental status due to his acute medical problems    The patient has baseline aphasia from prior stroke(s)    The patient has baseline dementia and is not reliable historian    The patient is in acute medical distress and unable to provide information           Total of 12 systems reviewed as follows:       POSITIVE= underlined text  Negative = text not underlined  General:  fever, chills, sweats, generalized weakness, weight loss/gain,      loss of appetite   Eyes:    blurred vision, eye pain, loss of vision, double vision  ENT:    rhinorrhea, pharyngitis   Respiratory:   cough, sputum production, SOB, NICHOLSON, wheezing, pleuritic pain   Cardiology:   chest pain, palpitations, orthopnea, PND, edema, syncope   Gastrointestinal:  abdominal pain , N/V, diarrhea, dysphagia, constipation, bleeding   Genitourinary:  frequency, urgency, dysuria, hematuria, incontinence   Muskuloskeletal :  arthralgia, myalgia, back pain  Hematology:  easy bruising, nose or gum bleeding, lymphadenopathy   Dermatological: rash, ulceration, pruritis, color change / jaundice  Endocrine:   hot flashes or polydipsia   Neurological:  headache, dizziness, confusion, focal weakness, paresthesia,     Speech difficulties, memory loss, gait difficulty  Psychological: Feelings of anxiety, depression, agitation    Objective:   VITALS:    Visit Vitals  /50   Pulse 71   Temp 98.2 °F (36.8 °C)   Resp 16   Ht 5' 3\" (1.6 m)   Wt 61.7 kg (136 lb 0.4 oz)   SpO2 97%   BMI 24.10 kg/m²       PHYSICAL EXAM:    General:    Alert, cooperative, no distress, appears stated age. HEENT: Atraumatic, anicteric sclerae, pink conjunctivae     No oral ulcers, mucosa dry, throat clear, dentition fair  Neck:  Supple, symmetrical,  thyroid: non tender  Lungs:   Clear to auscultation bilaterally.   No Wheezing or Rhonchi. No rales. Chest wall:  No tenderness  No Accessory muscle use. Heart:   Regular  rhythm,  No  murmur   No edema  Abdomen:   Soft, non-tender. Not distended. Bowel sounds normal  Extremities: No cyanosis. No clubbing,      Skin turgor normal, Capillary refill normal, Radial dial pulse 2+  Skin:     Not pale. Not Jaundiced  No rashes   Psych:  Good insight. Not depressed. Not anxious or agitated. Neurologic: EOMs intact. No facial asymmetry. No aphasia or slurred speech. Symmetrical strength, Sensation grossly intact. Alert and oriented X 4.     _______________________________________________________________________  Care Plan discussed with:    Comments   Patient y    Family      RN y    Care Manager                    Consultant:  jenn ED physician   _______________________________________________________________________  Expected  Disposition:   Home with Family y   HH/PT/OT/RN    SNF/LTC    AVA    ________________________________________________________________________  TOTAL TIME: 61 Minutes    Critical Care Provided     Minutes non procedure based      Comments    y Reviewed previous records   >50% of visit spent in counseling and coordination of care y Discussion with patient and questions answered       ________________________________________________________________________  Signed: Renita Johnson MD    Procedures: see electronic medical records for all procedures/Xrays and details which were not copied into this note but were reviewed prior to creation of Plan.     LAB DATA REVIEWED:    Recent Results (from the past 24 hour(s))   URINALYSIS W/ REFLEX CULTURE    Collection Time: 05/09/19  7:19 PM   Result Value Ref Range    Color YELLOW/STRAW      Appearance TURBID (A) CLEAR      Specific gravity 1.013 1.003 - 1.030      pH (UA) 5.0 5.0 - 8.0      Protein 100 (A) NEG mg/dL    Glucose NEGATIVE  NEG mg/dL    Ketone NEGATIVE  NEG mg/dL    Bilirubin NEGATIVE  NEG      Blood LARGE (A) NEG Urobilinogen 0.2 0.2 - 1.0 EU/dL    Nitrites NEGATIVE  NEG      Leukocyte Esterase LARGE (A) NEG      WBC  0 - 4 /hpf    RBC 10-20 0 - 5 /hpf    Epithelial cells MODERATE (A) FEW /lpf    Bacteria 3+ (A) NEG /hpf    UA:UC IF INDICATED URINE CULTURE ORDERED (A) CNI     CBC WITH AUTOMATED DIFF    Collection Time: 05/09/19  7:41 PM   Result Value Ref Range    WBC 15.2 (H) 3.6 - 11.0 K/uL    RBC 3.80 3.80 - 5.20 M/uL    HGB 12.3 11.5 - 16.0 g/dL    HCT 34.6 (L) 35.0 - 47.0 %    MCV 91.1 80.0 - 99.0 FL    MCH 32.4 26.0 - 34.0 PG    MCHC 35.5 30.0 - 36.5 g/dL    RDW 12.0 11.5 - 14.5 %    PLATELET 735 473 - 135 K/uL    MPV 10.5 8.9 - 12.9 FL    NRBC 0.0 0  WBC    ABSOLUTE NRBC 0.00 0.00 - 0.01 K/uL    NEUTROPHILS 88 (H) 32 - 75 %    LYMPHOCYTES 4 (L) 12 - 49 %    MONOCYTES 7 5 - 13 %    EOSINOPHILS 0 0 - 7 %    BASOPHILS 0 0 - 1 %    IMMATURE GRANULOCYTES 1 (H) 0.0 - 0.5 %    ABS. NEUTROPHILS 13.3 (H) 1.8 - 8.0 K/UL    ABS. LYMPHOCYTES 0.6 (L) 0.8 - 3.5 K/UL    ABS. MONOCYTES 1.1 (H) 0.0 - 1.0 K/UL    ABS. EOSINOPHILS 0.0 0.0 - 0.4 K/UL    ABS. BASOPHILS 0.0 0.0 - 0.1 K/UL    ABS. IMM. GRANS. 0.2 (H) 0.00 - 0.04 K/UL    DF AUTOMATED      RBC COMMENTS NORMOCYTIC, NORMOCHROMIC     METABOLIC PANEL, COMPREHENSIVE    Collection Time: 05/09/19  7:41 PM   Result Value Ref Range    Sodium 129 (L) 136 - 145 mmol/L    Potassium 3.7 3.5 - 5.1 mmol/L    Chloride 95 (L) 97 - 108 mmol/L    CO2 22 21 - 32 mmol/L    Anion gap 12 5 - 15 mmol/L    Glucose 99 65 - 100 mg/dL    BUN 60 (H) 6 - 20 MG/DL    Creatinine 2.30 (H) 0.55 - 1.02 MG/DL    BUN/Creatinine ratio 26 (H) 12 - 20      GFR est AA 25 (L) >60 ml/min/1.73m2    GFR est non-AA 21 (L) >60 ml/min/1.73m2    Calcium 8.3 (L) 8.5 - 10.1 MG/DL    Bilirubin, total 0.4 0.2 - 1.0 MG/DL    ALT (SGPT) 58 12 - 78 U/L    AST (SGOT) 80 (H) 15 - 37 U/L    Alk.  phosphatase 57 45 - 117 U/L    Protein, total 7.3 6.4 - 8.2 g/dL    Albumin 2.8 (L) 3.5 - 5.0 g/dL    Globulin 4.5 (H) 2.0 - 4.0 g/dL    A-G Ratio 0.6 (L) 1.1 - 2.2     LIPASE    Collection Time: 05/09/19  7:41 PM   Result Value Ref Range    Lipase 82 73 - 393 U/L   LACTIC ACID    Collection Time: 05/09/19  7:41 PM   Result Value Ref Range    Lactic acid 1.1 0.4 - 2.0 MMOL/L   POC LACTIC ACID    Collection Time: 05/09/19  7:47 PM   Result Value Ref Range    Lactic Acid (POC) 1.08 0.40 - 2.00 mmol/L

## 2019-05-10 NOTE — PROGRESS NOTES
Admission: Pt arrived to the floor via wheelchair with no family at bedside, vitals stable. Pt is alert and oriented x4 and denies any pain. Pt is able to ambulate without assistance. No items sent to safe and Pt medications will be sent home with daughter when she arrives shortly. Admission database completed and dual skin signed off completed.  Will cont to monitor

## 2019-05-10 NOTE — ROUTINE PROCESS
TRANSFER - OUT REPORT:    Verbal report given to MOISÉS Wadsworth on Ha  being transferred to  for routine progression of care       Report consisted of patients Situation, Background, Assessment and   Recommendations(SBAR). Information from the following report(s) SBAR, Kardex, ED Summary and MAR was reviewed with the receiving nurse. Opportunity for questions and clarification was provided.       Patient transported with:   [employer]

## 2019-05-10 NOTE — PROGRESS NOTES
Spiritual Care Assessment/Progress Note  [facility]      NAME: Moreno Cruz      MRN: [id number]  AGE: 75 y.o.  SEX: female  Restoration Affiliation: Unknown   Language: English     5/10/2019     Total Time (in minutes): 37     Spiritual Assessment begun in [facility] through conversation with:         [x]Patient        [] Family    [] Friend(s)        Reason for Consult: Advance medical directive consult     Spiritual beliefs: (Please include comment if needed)     [x] Identifies with a sveta tradition:   Mu-ism      [x] Supported by a sveta community:            [] Claims no spiritual orientation:           [] Seeking spiritual identity:                [] Adheres to an individual form of spirituality:           [] Not able to assess:                           Identified resources for coping:      [] Prayer                               [] Music                  [] Guided Imagery     [x] Family/friends                 [] Pet visits     [] Devotional reading                         [] Unknown     [] Other:                                              Interventions offered during this visit: (See comments for more details)    Patient Interventions: Advance medical directive consult, Affirmation of emotions/emotional suffering, Affirmation of sveta, Coping skills reviewed/reinforced, Initial/Spiritual assessment, patient floor, Normalization of emotional/spiritual concerns           Plan of Care:     [] Support spiritual and/or cultural needs    [x] Support AMD and/or advance care planning process      [] Support grieving process   [] Coordinate Rites and/or Rituals    [] Coordination with community clergy   [] No spiritual needs identified at this time   [] Detailed Plan of Care below (See Comments)  [] Make referral to Music Therapy  [] Make referral to Pet Therapy     [] Make referral to Addiction services  [] Make referral to [facility]  [] Make referral to Spiritual Care Partner  [] No future visits requested        [x] Follow up visits as needed     Comments:  Request by peter to assist with Advance Medical Directive (AMD) in Oncology. Consulted with patients nurse. Explained document to patient and left paperwork to be completed at a later time per patient's request. Patient and  discussed end of life topics as well as theology. Plan of care is to follow up with patient as needed. PETAR Fowlre [address] Paging Service [phone])

## 2019-05-11 LAB
ANION GAP SERPL CALC-SCNC: 8 MMOL/L (ref 5–15)
BACTERIA SPEC CULT: ABNORMAL
BASOPHILS # BLD: 0.1 K/UL (ref 0–0.1)
BASOPHILS NFR BLD: 1 % (ref 0–1)
BUN SERPL-MCNC: 28 MG/DL (ref 6–20)
BUN/CREAT SERPL: 22 (ref 12–20)
CALCIUM SERPL-MCNC: 7.5 MG/DL (ref 8.5–10.1)
CC UR VC: ABNORMAL
CHLORIDE SERPL-SCNC: 109 MMOL/L (ref 97–108)
CO2 SERPL-SCNC: 24 MMOL/L (ref 21–32)
CREAT SERPL-MCNC: 1.27 MG/DL (ref 0.55–1.02)
DIFFERENTIAL METHOD BLD: ABNORMAL
EOSINOPHIL # BLD: 0.1 K/UL (ref 0–0.4)
EOSINOPHIL NFR BLD: 1 % (ref 0–7)
ERYTHROCYTE [DISTWIDTH] IN BLOOD BY AUTOMATED COUNT: 12.3 % (ref 11.5–14.5)
GLUCOSE SERPL-MCNC: 145 MG/DL (ref 65–100)
HCT VFR BLD AUTO: 33.7 % (ref 35–47)
HGB BLD-MCNC: 10.9 G/DL (ref 11.5–16)
IMM GRANULOCYTES # BLD AUTO: 0.1 K/UL (ref 0–0.04)
IMM GRANULOCYTES NFR BLD AUTO: 1 % (ref 0–0.5)
LACTATE SERPL-SCNC: 1.9 MMOL/L (ref 0.4–2)
LYMPHOCYTES # BLD: 1.1 K/UL (ref 0.8–3.5)
LYMPHOCYTES NFR BLD: 11 % (ref 12–49)
MAGNESIUM SERPL-MCNC: 2.3 MG/DL (ref 1.6–2.4)
MCH RBC QN AUTO: 31.3 PG (ref 26–34)
MCHC RBC AUTO-ENTMCNC: 32.3 G/DL (ref 30–36.5)
MCV RBC AUTO: 96.8 FL (ref 80–99)
MONOCYTES # BLD: 0.8 K/UL (ref 0–1)
MONOCYTES NFR BLD: 8 % (ref 5–13)
NEUTS SEG # BLD: 7.7 K/UL (ref 1.8–8)
NEUTS SEG NFR BLD: 78 % (ref 32–75)
NRBC # BLD: 0 K/UL (ref 0–0.01)
NRBC BLD-RTO: 0 PER 100 WBC
PHOSPHATE SERPL-MCNC: 1.6 MG/DL (ref 2.6–4.7)
PLATELET # BLD AUTO: 225 K/UL (ref 150–400)
PMV BLD AUTO: 10.9 FL (ref 8.9–12.9)
POTASSIUM SERPL-SCNC: 3.4 MMOL/L (ref 3.5–5.1)
RBC # BLD AUTO: 3.48 M/UL (ref 3.8–5.2)
SERVICE CMNT-IMP: ABNORMAL
SODIUM SERPL-SCNC: 141 MMOL/L (ref 136–145)
WBC # BLD AUTO: 9.8 K/UL (ref 3.6–11)

## 2019-05-11 PROCEDURE — 74011250637 HC RX REV CODE- 250/637: Performed by: EMERGENCY MEDICINE

## 2019-05-11 PROCEDURE — 74011250636 HC RX REV CODE- 250/636: Performed by: EMERGENCY MEDICINE

## 2019-05-11 PROCEDURE — 65660000000 HC RM CCU STEPDOWN

## 2019-05-11 PROCEDURE — 80048 BASIC METABOLIC PNL TOTAL CA: CPT

## 2019-05-11 PROCEDURE — 36415 COLL VENOUS BLD VENIPUNCTURE: CPT

## 2019-05-11 PROCEDURE — 83735 ASSAY OF MAGNESIUM: CPT

## 2019-05-11 PROCEDURE — 74011250636 HC RX REV CODE- 250/636: Performed by: INTERNAL MEDICINE

## 2019-05-11 PROCEDURE — 74011000258 HC RX REV CODE- 258: Performed by: EMERGENCY MEDICINE

## 2019-05-11 PROCEDURE — 83605 ASSAY OF LACTIC ACID: CPT

## 2019-05-11 PROCEDURE — 84100 ASSAY OF PHOSPHORUS: CPT

## 2019-05-11 PROCEDURE — 85025 COMPLETE CBC W/AUTO DIFF WBC: CPT

## 2019-05-11 PROCEDURE — 74011250637 HC RX REV CODE- 250/637: Performed by: INTERNAL MEDICINE

## 2019-05-11 RX ORDER — POLYETHYLENE GLYCOL 3350 17 G/17G
17 POWDER, FOR SOLUTION ORAL DAILY
Status: DISCONTINUED | OUTPATIENT
Start: 2019-05-11 | End: 2019-05-12 | Stop reason: HOSPADM

## 2019-05-11 RX ORDER — POTASSIUM CHLORIDE 750 MG/1
20 TABLET, FILM COATED, EXTENDED RELEASE ORAL ONCE
Status: COMPLETED | OUTPATIENT
Start: 2019-05-11 | End: 2019-05-11

## 2019-05-11 RX ADMIN — HEPARIN SODIUM 5000 UNITS: 5000 INJECTION INTRAVENOUS; SUBCUTANEOUS at 07:06

## 2019-05-11 RX ADMIN — POTASSIUM CHLORIDE 20 MEQ: 750 TABLET, EXTENDED RELEASE ORAL at 18:00

## 2019-05-11 RX ADMIN — SODIUM CHLORIDE 250 ML: 900 INJECTION, SOLUTION INTRAVENOUS at 11:32

## 2019-05-11 RX ADMIN — HEPARIN SODIUM 5000 UNITS: 5000 INJECTION INTRAVENOUS; SUBCUTANEOUS at 23:41

## 2019-05-11 RX ADMIN — LEVOTHYROXINE SODIUM 100 MCG: 100 TABLET ORAL at 07:06

## 2019-05-11 RX ADMIN — Medication 10 ML: at 07:07

## 2019-05-11 RX ADMIN — CEFEPIME HYDROCHLORIDE 2 G: 2 INJECTION, POWDER, FOR SOLUTION INTRAVENOUS at 11:31

## 2019-05-11 RX ADMIN — POLYETHYLENE GLYCOL 3350 17 G: 17 POWDER, FOR SOLUTION ORAL at 11:31

## 2019-05-11 RX ADMIN — SODIUM CHLORIDE 125 ML/HR: 900 INJECTION, SOLUTION INTRAVENOUS at 15:16

## 2019-05-11 RX ADMIN — HEPARIN SODIUM 5000 UNITS: 5000 INJECTION INTRAVENOUS; SUBCUTANEOUS at 15:16

## 2019-05-11 RX ADMIN — Medication 10 ML: at 23:42

## 2019-05-11 RX ADMIN — PRAVASTATIN SODIUM 80 MG: 40 TABLET ORAL at 23:41

## 2019-05-11 NOTE — PROGRESS NOTES
Oncology End of Shift Note      Bedside shift change report given to Mai Marcano (incoming nurse) by Brittaney Castro (outgoing nurse) on [facility]. Report included the following information SBAR, Kardex, Intake/Output and MAR. Shift Summary: slept most of the night, Low BP throughtout the night, increased with oral hydration, no compliants of pain, daughter visited for a couple hours    Issues for Physician to Address:       Patient on Cardiac Monitoring?     [x] Yes  [] No    Rhythm: NSR/Sinus Audie (dipped down to 39 when asleep)         Shift Events        Brittaney Castro

## 2019-05-11 NOTE — PROGRESS NOTES
Hospitalist Progress Note    NAME: Arturo Sep   :  1945   MRN:  [id number]       Assessment / Plan:    Sepsis with leukocytosis, hypotension with SBP 87/51 and fever of 101.7 at home POA  Due to complicated UTI in settings of recent bladder tumor resection, stents placement then removal  -on IV Rocephin, change to cefepime since cx appears to be growing pseudomonas  -f/u urine and blood cultures  -Seen by urology. For now plan to treat with abx and monitor crt. -BP still low, gave IVF bolus this am and checked lactic- neg, cont IVFs, BP better, now on correct abx     URVASHI  Hyponatremia  hypok  -IVF and monitor lytes  -creat better  -supp potassium po    Constipation  -try miralax, follow     HTN (hypertension)   Hold BP meds as was hypotensive, Bp still borderline  PRN nitropaste     Hypothyroidism   Continue synthroid     Hyperlipidemia   Continue statins     Code Status: Full  Surrogate Decision Maker: Daughter     DVT Prophylaxis: heparin sq, mobilize       Baseline: functional    18.5 - 24.9 Normal weight / Body mass index is 24.1 kg/m². Recommended Disposition: Home w/Family and HH PT, OT, RN     Subjective:     Chief Complaint / Reason for Physician Visit  Fever n/v    Patient reports her normal blood pressures are controlled on medication run in the 1 26-6 20 systolic. She denies any lightheadedness or dizziness currently. She overall feels better no nausea vomiting is able to eat and drink. She denies any abdominal pain is passing her urine okay denies any shortness of breath or chest pain. Patient was evaluated at 8:15 AM      Discussed with RN events overnight.      Review of Systems:  Symptom Y/N Comments  Symptom Y/N Comments   Fever/Chills    Chest Pain n    Poor Appetite y better  Edema     Cough n   Abdominal Pain n    Sputum    Joint Pain     SOB/NICHOLSON n   Pruritis/Rash     Nausea/vomit n   Tolerating PT/OT     Diarrhea    Tolerating Diet y    Constipation y   Other       Could NOT obtain due to:      Objective:     VITALS:   Last 24hrs VS reviewed since prior progress note. Most recent are:  Patient Vitals for the past 24 hrs:   Temp Pulse Resp BP SpO2   05/11/19 1535 97.9 °F (36.6 °C) 66 18 108/41 100 %   05/11/19 1331 -- -- -- 113/54 --   05/11/19 0800 97.6 °F (36.4 °C) 65 16 (!) 95/39 98 %   05/11/19 0249 99 °F (37.2 °C) 69 16 90/57 100 %   05/10/19 2329 -- -- -- 107/52 --   05/10/19 2309 98.9 °F (37.2 °C) 64 16 (!) 78/35 98 %   05/10/19 1942 98.6 °F (37 °C) 67 18 91/41 96 %   05/10/19 1557 97.8 °F (36.6 °C) (!) 55 16 (!) 102/34 99 %       Intake/Output Summary (Last 24 hours) at 5/11/2019 1551  Last data filed at 5/11/2019 0758  Gross per 24 hour   Intake 3280.42 ml   Output --   Net 3280.42 ml        PHYSICAL EXAM:  Patient is awake and alert nontoxic-appearing oriented x3 on room air. Conjunctiva slightly pale mucous membranes slightly tacky cardiovascular regular rate no murmurs rubs or gallops. Lungs are clear no wheezes rhonchi crackles. Abdomen bowel sounds present soft currently nontender extremities no clubbing cyanosis or edema        Reviewed most current lab test results and cultures  YES  Reviewed most current radiology test results   YES  Review and summation of old records today    NO  Reviewed patient's current orders and MAR    YES  PMH/ reviewed - no change compared to H&P  ________________________________________________________________________  Care Plan discussed with:    Comments   Patient y    Family      RN y    Care Manager     Consultant                        Multidiciplinary team rounds were held today with , nursing, pharmacist and clinical coordinator. Patient's plan of care was discussed; medications were reviewed and discharge planning was addressed.      ________________________________________________________________________  Total NON critical care TIME:     Minutes    Total CRITICAL CARE TIME Spent:   Minutes non procedure based Comments   >50% of visit spent in counseling and coordination of care     ________________________________________________________________________  Rima Martin MD     Procedures: see electronic medical records for all procedures/Xrays and details which were not copied into this note but were reviewed prior to creation of Plan. LABS:  I reviewed today's most current labs and imaging studies.   Pertinent labs include:  Recent Labs     05/11/19  1111 05/10/19  0436 05/09/19 1941   WBC 9.8 12.6* 15.2*   HGB 10.9* 11.3* 12.3   HCT 33.7* 33.1* 34.6*    210 246     Recent Labs     05/11/19  1111 05/10/19  0436 05/09/19 1941    136 129*   K 3.4* 3.6 3.7   * 108 95*   CO2 24 20* 22   * 117* 99   BUN 28* 52* 60*   CREA 1.27* 1.69* 2.30*   CA 7.5* 7.5* 8.3*   MG 2.3  --   --    PHOS 1.6*  --   --    ALB  --  2.3* 2.8*   TBILI  --  0.3 0.4   SGOT  --  122* 80*   ALT  --  91* 58       Signed: Rima Martin MD

## 2019-05-11 NOTE — PROGRESS NOTES
Pharmacy Automatic Renal Dosing Protocol - Antimicrobials     Indication for Antimicrobials: Complicated UTI secondary to recent bladder tumor resection, stent placement and removal     Current Regimen of Each Antimicrobial:  Cefepime 1 gm IV Q12H (Start Date ; Day # 1)     Previous Antimicrobial Therapy:  Ceftriaxone 1 gm IV Q24H (Start Date 5/10/19 - 19 - received only 1 dose)     Goal Level:      Date Dose & Interval Measured (mcg/mL) Extrapolated (mcg/mL)                                    Date & time of next level:      Significant Cultures:   19 - Urine cx - NG x 2 D - pending  19 - P Blood Cx - possible pseudomonas - 75,000 colonies - pending     Radiology / Imaging results: (X-ray, CT scan or MRI):   - CT abdomen - Bilateral nephrolithiasis.     Paralysis, amputations, malnutrition: n/a     Labs:       Recent Labs     05/10/19  0436 19  1941   CREA 1.69* 2.30*   BUN 52* 60*   WBC 12.6* 15.2*      Temp (24hrs), Av.2 °F (36.8 °C), Min:96.7 °F (35.9 °C), Max:99 °F (37.2 °C)     Creatinine Clearance (mL/min) or Dialysis: 24 ml/min     Impression/Plan:   · SCr and WBC slowly improving  · Urine cx showing probably pseudomonal growth. · Discussed with Dr. Nelson Section. Will adjust Cefepime to 2 gm IV Q24H for probable pseudomonal growth. Will continue to monitor pt's renal function and adjust abx frequency accordingly. · Antimicrobial stop date TBD      Pharmacy will follow daily and adjust medications as appropriate for renal function and/or serum levels.     Thank you,  HERNAN Ortiz     Recommended duration of therapy  [email or web address]/[place]/[facility]/Pharmacy/Clinical%20Companion/[email or web address]. docx     Renal Dosing  [email or web address]/[place]/[facility]/Pharmacy/Clinical%20Companion/Renal%[email or web address]. pdf

## 2019-05-11 NOTE — PROGRESS NOTES
Problem: Urinary Tract Infection - Adult  Goal: *Absence of infection signs and symptoms  Outcome: Progressing Towards Goal     Problem: Patient Education: Go to Patient Education Activity  Goal: Patient/Family Education  Outcome: Progressing Towards Goal     Problem: Falls - Risk of  Goal: *Absence of Falls  Description  Document David England Fall Risk and appropriate interventions in the flowsheet. Outcome: Progressing Towards Goal     Problem: Patient Education: Go to Patient Education Activity  Goal: Patient/Family Education  Outcome: Progressing Towards Goal     Problem: Pressure Injury - Risk of  Goal: *Prevention of pressure injury  Description  Document Carlos A Scale and appropriate interventions in the flowsheet.   Outcome: Progressing Towards Goal     Problem: Patient Education: Go to Patient Education Activity  Goal: Patient/Family Education  Outcome: Progressing Towards Goal

## 2019-05-12 VITALS
TEMPERATURE: 98.3 F | WEIGHT: 136.02 LBS | HEART RATE: 61 BPM | RESPIRATION RATE: 16 BRPM | BODY MASS INDEX: 24.1 KG/M2 | SYSTOLIC BLOOD PRESSURE: 121 MMHG | OXYGEN SATURATION: 99 % | DIASTOLIC BLOOD PRESSURE: 55 MMHG | HEIGHT: 63 IN

## 2019-05-12 LAB
ANION GAP SERPL CALC-SCNC: 8 MMOL/L (ref 5–15)
BASOPHILS # BLD: 0.1 K/UL (ref 0–0.1)
BASOPHILS NFR BLD: 1 % (ref 0–1)
BUN SERPL-MCNC: 20 MG/DL (ref 6–20)
BUN/CREAT SERPL: 18 (ref 12–20)
CALCIUM SERPL-MCNC: 7.4 MG/DL (ref 8.5–10.1)
CHLORIDE SERPL-SCNC: 112 MMOL/L (ref 97–108)
CO2 SERPL-SCNC: 23 MMOL/L (ref 21–32)
CREAT SERPL-MCNC: 1.14 MG/DL (ref 0.55–1.02)
DIFFERENTIAL METHOD BLD: ABNORMAL
EOSINOPHIL # BLD: 0.1 K/UL (ref 0–0.4)
EOSINOPHIL NFR BLD: 1 % (ref 0–7)
ERYTHROCYTE [DISTWIDTH] IN BLOOD BY AUTOMATED COUNT: 12.2 % (ref 11.5–14.5)
GLUCOSE SERPL-MCNC: 96 MG/DL (ref 65–100)
HCT VFR BLD AUTO: 31.9 % (ref 35–47)
HGB BLD-MCNC: 10.7 G/DL (ref 11.5–16)
IMM GRANULOCYTES # BLD AUTO: 0.1 K/UL (ref 0–0.04)
IMM GRANULOCYTES NFR BLD AUTO: 1 % (ref 0–0.5)
LYMPHOCYTES # BLD: 1.6 K/UL (ref 0.8–3.5)
LYMPHOCYTES NFR BLD: 15 % (ref 12–49)
MAGNESIUM SERPL-MCNC: 2.1 MG/DL (ref 1.6–2.4)
MCH RBC QN AUTO: 31.8 PG (ref 26–34)
MCHC RBC AUTO-ENTMCNC: 33.5 G/DL (ref 30–36.5)
MCV RBC AUTO: 94.9 FL (ref 80–99)
MONOCYTES # BLD: 0.9 K/UL (ref 0–1)
MONOCYTES NFR BLD: 8 % (ref 5–13)
NEUTS SEG # BLD: 7.5 K/UL (ref 1.8–8)
NEUTS SEG NFR BLD: 74 % (ref 32–75)
NRBC # BLD: 0 K/UL (ref 0–0.01)
NRBC BLD-RTO: 0 PER 100 WBC
PHOSPHATE SERPL-MCNC: 1.6 MG/DL (ref 2.6–4.7)
PLATELET # BLD AUTO: 235 K/UL (ref 150–400)
PMV BLD AUTO: 10.5 FL (ref 8.9–12.9)
POTASSIUM SERPL-SCNC: 4.1 MMOL/L (ref 3.5–5.1)
RBC # BLD AUTO: 3.36 M/UL (ref 3.8–5.2)
SODIUM SERPL-SCNC: 143 MMOL/L (ref 136–145)
WBC # BLD AUTO: 10.2 K/UL (ref 3.6–11)

## 2019-05-12 PROCEDURE — 74011250637 HC RX REV CODE- 250/637: Performed by: EMERGENCY MEDICINE

## 2019-05-12 PROCEDURE — 84100 ASSAY OF PHOSPHORUS: CPT

## 2019-05-12 PROCEDURE — 74011250637 HC RX REV CODE- 250/637: Performed by: INTERNAL MEDICINE

## 2019-05-12 PROCEDURE — 74011000250 HC RX REV CODE- 250: Performed by: EMERGENCY MEDICINE

## 2019-05-12 PROCEDURE — 85025 COMPLETE CBC W/AUTO DIFF WBC: CPT

## 2019-05-12 PROCEDURE — 74011250636 HC RX REV CODE- 250/636: Performed by: INTERNAL MEDICINE

## 2019-05-12 PROCEDURE — 83735 ASSAY OF MAGNESIUM: CPT

## 2019-05-12 PROCEDURE — 80048 BASIC METABOLIC PNL TOTAL CA: CPT

## 2019-05-12 PROCEDURE — 36415 COLL VENOUS BLD VENIPUNCTURE: CPT

## 2019-05-12 PROCEDURE — 74011250636 HC RX REV CODE- 250/636: Performed by: EMERGENCY MEDICINE

## 2019-05-12 PROCEDURE — 74011000258 HC RX REV CODE- 258: Performed by: EMERGENCY MEDICINE

## 2019-05-12 RX ORDER — CIPROFLOXACIN 500 MG/1
500 TABLET ORAL EVERY 12 HOURS
Status: DISCONTINUED | OUTPATIENT
Start: 2019-05-12 | End: 2019-05-12 | Stop reason: HOSPADM

## 2019-05-12 RX ORDER — CIPROFLOXACIN 500 MG/1
500 TABLET ORAL EVERY 12 HOURS
Qty: 28 TAB | Refills: 0 | Status: SHIPPED | OUTPATIENT
Start: 2019-05-12 | End: 2019-05-26

## 2019-05-12 RX ADMIN — HEPARIN SODIUM 5000 UNITS: 5000 INJECTION INTRAVENOUS; SUBCUTANEOUS at 06:07

## 2019-05-12 RX ADMIN — SODIUM PHOSPHATE, MONOBASIC, MONOHYDRATE: 276; 142 INJECTION, SOLUTION INTRAVENOUS at 08:52

## 2019-05-12 RX ADMIN — LEVOTHYROXINE SODIUM 100 MCG: 100 TABLET ORAL at 06:06

## 2019-05-12 RX ADMIN — CEFEPIME HYDROCHLORIDE 2 G: 2 INJECTION, POWDER, FOR SOLUTION INTRAVENOUS at 08:51

## 2019-05-12 RX ADMIN — CIPROFLOXACIN 500 MG: 500 TABLET, FILM COATED ORAL at 15:09

## 2019-05-12 NOTE — DISCHARGE SUMMARY
Hospitalist Discharge Summary     Patient ID:  Syeda Garber  [id number]  75 y.o.  1945 5/9/2019    PCP on record: Patel Fitzgerald NP    Admit date: 5/9/2019  Discharge date and time: 5/12/2019    DISCHARGE DIAGNOSIS:  Severe sepsis secondary to complicated urinary tract infection with Pseudomonas  Recent bladder surgery and stent, complicated by above infection  Hypotension  Asymptomatic bradycardia, suspect increased vagal tone, may need to follow-up with cardiology as an outpatient if persistent or symptomatic  Acute kidney injury  Hyponatremia  Hypokalemia  Hypophosphatemia  History of hypertension meds held secondary to low  Hypothyroidism  Hyperlipidemia      CONSULTATIONS:  IP CONSULT TO UROLOGY  IP CONSULT TO HOSPITALIST    Excerpted HPI from H&P of Pushpa Ames MD:  CHIEF COMPLAINT: fevers, nausea, vomiting     HISTORY OF PRESENT ILLNESS:     Syeda Garber is a 75 y.o.  female who presents with fevers and chills. As per patient, she had surgery for bladder tumor in end of April and also noted to have kidney stones and stents were place which now removed day before yesterday. Pt reported nausea and vomiting x2 episode on the days of surgery and some diarrhea like bowel movement but no more diarrhea since yesterday after taking immodium. Pt reported fever upto 101.7 at home associated with chills yesterday. She called her urologist Dr Reza Conde who recommend her to come to ED for evaluation. She denies any chest pain, cough, problems urination, abdominal pain. In ED pt noted to be hypotensive, leukocytosis, UTi on UA, elevated Creatinine & CT A/P with Bilateral nephrolithiasis. There is urothelial thickening from the right renal collecting system to the bladder. There is stranding adjacent to the right kidney and right ureter suggesting inflammation. There are punctate calculi in the anterior bladder of uncertain significance, possibly calculi.  Query eccentric bladder wall thickening anteriorly on the right. Recommend urologic follow-up. Incidental findings as above     We were asked to admit for work up and evaluation of the above problems.            ______________________________________________________________________  DISCHARGE SUMMARY/HOSPITAL COURSE:  for full details see H&P, daily progress notes, labs, consult notes. Sepsis with leukocytosis, hypotension with SBP 87/51 and fever of 101.7 at home POA  Due to complicated UTI in settings of recent bladder tumor resection, stents placement then removal  -on IV Rocephin, changed to cefepime since cx appears to be growing pseudomonas  -f/u urine- pseudomonas and blood cultures- neg  -Seen by urology. Martir Dunn now plan to treat with abx and monitor crt. -BP better, normal lactic- neg  - pseudomonas sensitive to cipro, change to po for dc, plan 2 week course, but plan to f/u with urology before course completed     URVASHI  Hyponatremia  hypok  hypophos  -IVF and monitor lytes  -ace/diuretic on hold  -creat better from 2.3 to 1.14 today  - potassium now normal  -low phos, supp IV before dc and 3 days oral supp as well and f/u labs with pcp next week     Constipation  -success with miralax, follow     HTN (hypertension)   Hold BP meds as was hypotensive, Bp still borderline  PRN nitropaste  Hold bp meds at dc     Hypothyroidism   Continue synthroid     Hyperlipidemia   Continue statins        Recommended Disposition: Home w/Family     Patient does not feel she needs home health but will try to arrange a hospital to home visit if she qualifies. She will be staying with her daughter who will monitor her blood pressure and heart rate and certainly if she has any symptoms of lightheadedness or dizziness to check her blood pressure and heart rate. I suspect her current asymptomatic bradycardia may be due to some increased vagal tone from her complicated urinary tract infection and hopefully will improve with continued treatment of her UTI. If it is persistent and symptomatic may need referral to cardiology. Her blood pressures still remain on the lower side today as high as in the 026F systolic. Therefore, we will hold  her home antihypertensives. her renal function has improved significantly and before considering which antihypertensive to resume in the future may need to make sure her renal function remains stable. Patient will need to follow-up with her primary care physician next week and I would recommend labs at that time a CBC, BMP, mag and phosphorus. She received IV phosphorus prior to her discharge today. She will also need to follow-up with urology in the next 7 to 10 days I have asked her to see the urologist prior to completing her antibiotic course. Given that her infection is a complicated UTI with Pseudomonas and recent surgery, we will treat her for a 2-week course. She has Pseudomonas sensitive to Cipro. I have asked her to take probiotics while she is on antibiotics and monitor for diarrhea.      _______________________________________________________________________  Patient seen and examined by me on discharge day. Pertinent Findings:  Patient is eager to go home. She says she feels great. Denies any lightheadedness or dizziness. No shortness of breath or chest pain has been ambulating in room without difficulty does not feel she needs home health. She denies any urinary symptoms her appetite is good she did end up having a bowel movement. Patient is awake alert well-appearing no distress oriented x3. Cardiovascular regular rate lungs are clear abdomen benign clubbing cyanosis or edema.    _______________________________________________________________________  DISCHARGE MEDICATIONS:   Current Discharge Medication List      START taking these medications    Details   ciprofloxacin HCl (CIPRO) 500 mg tablet Take 1 Tab by mouth every twelve (12) hours for 14 days.   Qty: 28 Tab, Refills: 0      L. acidoph & paracasei- S therm- Bifido (FRANKLYN-Q/RISAQUAD) 8 billion cell cap cap Take 1 Cap by mouth daily for 30 days. Qty: 30 Cap, Refills: 0      phosphorus (K PHOS NEUTRAL) 250 mg tablet Take 1 Tab by mouth two (2) times a day for 3 days. Qty: 6 Tab, Refills: 0         CONTINUE these medications which have NOT CHANGED    Details   simvastatin (ZOCOR) 40 mg tablet Take 40 mg by mouth nightly. levothyroxine (SYNTHROID) 100 mcg tablet Take 100 mcg by mouth Daily (before breakfast). STOP taking these medications       cefUROXime (CEFTIN) 250 mg tablet Comments:   Reason for Stopping:         lisinopril-hydroCHLOROthiazide (PRINZIDE, ZESTORETIC) 20-12.5 mg per tablet Comments:   Reason for Stopping:         lisinopril (PRINIVIL, ZESTRIL) 20 mg tablet Comments:   Reason for Stopping:                Follow-up Information     Follow up With Specialties Details Why Contact Info    Henry Brock MD Urology Schedule an appointment as soon as possible for a visit in 1 week Urology follow-up [address] [address]      Renny Saldana NP Nurse Practitioner Schedule an appointment as soon as possible for a visit in 1 week hospital follow-up [address] [facility] [id number] [phone]          ________________________________________________________________    Risk of deterioration: Moderate    Condition at Discharge:  Stable  __________________________________________________________________    Disposition  Home with family, no needs declined home health and H2H    ____________________________________________________________________    Code Status: Full Code  ___________________________________________________________________      Total time in minutes spent coordinating this discharge (includes going over instructions, follow-up, prescriptions, and preparing report for sign off to her PCP) : 40 minutes    Signed:  Nia Shaikh MD

## 2019-05-12 NOTE — PROGRESS NOTES
Discharge instructions, prescriptions and follow up appointments reviewed with patient and patient's daughter both of whom verbalized an understanding. An opportunity for questions and clarification was provided for.

## 2019-05-12 NOTE — PROGRESS NOTES
Oncology End of Shift Note      Bedside shift change report given to Дмитрий Cordova RN (incoming nurse) by Sanchez Paz (outgoing nurse) on [facility]. Report included the following information SBAR, Kardex, Intake/Output and MAR. Shift Summary: slept most of the night, no complaints of pain, BP improved this shift, am labs drawn        Issues for Physician to Address:       Patient on Cardiac Monitoring?     [x] Yes  [] No    Rhythm: NSR/Sinus Camden Mocha         Shift Events        Sanchez Paz

## 2019-05-12 NOTE — PROGRESS NOTES
Problem: Pressure Injury - Risk of  Goal: *Prevention of pressure injury  Description  Document Carlos A Scale and appropriate interventions in the flowsheet.   Outcome: Progressing Towards Goal

## 2019-05-12 NOTE — PROGRESS NOTES
BLUE: Follow-up Care Appointments    DISCHARGE ORDERS ACKNOWLEDGED. Pt to discharge home today by private vehicle with family. CM consulted to arrange [place] services or H2H sepsis. CM discussed with pt who declined both services, reported that she lives with her DTR, DTR's , and family. Pt does not feel that she needs these services and her DTR is a CNA. CM informed pt should she change her mind to discuss with PCP when she follows-up post-discharge. Pt/family to call and schedule PCP and Urology f/u appointments, as offices are closed at this time. Confirmed that she sees Yogi Rosa NP for primary care. CM updated info in chart. All information entered into pt AVS.     Pt has no additional CM needs at this time. Care Management Interventions  PCP Verified by CM:  Yes  Palliative Care Criteria Met (RRAT>21 & CHF Dx)?: No  Transition of Care Consult (CM Consult): Discharge Planning  Discharge Durable Medical Equipment: No  Health Maintenance Reviewed: Yes  Physical Therapy Consult: No  Occupational Therapy Consult: No  Speech Therapy Consult: No  Current Support Network: Relative's Home, Lives with Caregiver(Lives with DTR, son-in-law, and grandchildren)  Confirm Follow Up Transport: Family  Plan discussed with Pt/Family/Caregiver: Yes  Discharge Location  Discharge Placement: Home with family assistance    THERESA Triplett [employer] Work, [address] [place] [address]  [phone]

## 2019-05-14 LAB
BACTERIA SPEC CULT: NORMAL
SERVICE CMNT-IMP: NORMAL

## 2022-10-18 ENCOUNTER — HOSPITAL ENCOUNTER (INPATIENT)
Age: 77
LOS: 3 days | Discharge: HOME | DRG: 853 | End: 2022-10-22
Attending: EMERGENCY MEDICINE | Admitting: STUDENT IN AN ORGANIZED HEALTH CARE EDUCATION/TRAINING PROGRAM
Payer: MEDICARE

## 2022-10-18 DIAGNOSIS — N39.0 URINARY TRACT INFECTION WITH HEMATURIA, SITE UNSPECIFIED: ICD-10-CM

## 2022-10-18 DIAGNOSIS — A41.9 SEVERE SEPSIS (HCC): Primary | ICD-10-CM

## 2022-10-18 DIAGNOSIS — R31.9 URINARY TRACT INFECTION WITH HEMATURIA, SITE UNSPECIFIED: ICD-10-CM

## 2022-10-18 DIAGNOSIS — N13.2 HYDRONEPHROSIS WITH URINARY OBSTRUCTION DUE TO URETERAL CALCULUS: ICD-10-CM

## 2022-10-18 DIAGNOSIS — N20.1 BILATERAL URETERAL CALCULI: ICD-10-CM

## 2022-10-18 DIAGNOSIS — R65.20 SEVERE SEPSIS (HCC): Primary | ICD-10-CM

## 2022-10-18 LAB — LACTATE BLD-SCNC: 5.17 MMOL/L (ref 0.4–2)

## 2022-10-18 PROCEDURE — 93005 ELECTROCARDIOGRAM TRACING: CPT

## 2022-10-18 PROCEDURE — 84484 ASSAY OF TROPONIN QUANT: CPT

## 2022-10-18 PROCEDURE — 87040 BLOOD CULTURE FOR BACTERIA: CPT

## 2022-10-18 PROCEDURE — 87077 CULTURE AEROBIC IDENTIFY: CPT

## 2022-10-18 PROCEDURE — 80053 COMPREHEN METABOLIC PANEL: CPT

## 2022-10-18 PROCEDURE — 87186 SC STD MICRODIL/AGAR DIL: CPT

## 2022-10-18 PROCEDURE — 99285 EMERGENCY DEPT VISIT HI MDM: CPT

## 2022-10-18 PROCEDURE — 85025 COMPLETE CBC W/AUTO DIFF WBC: CPT

## 2022-10-18 PROCEDURE — 74011250636 HC RX REV CODE- 250/636: Performed by: EMERGENCY MEDICINE

## 2022-10-18 PROCEDURE — 36415 COLL VENOUS BLD VENIPUNCTURE: CPT

## 2022-10-18 PROCEDURE — 83605 ASSAY OF LACTIC ACID: CPT

## 2022-10-18 RX ADMIN — SODIUM CHLORIDE 1000 ML: 9 INJECTION, SOLUTION INTRAVENOUS at 23:55

## 2022-10-18 RX ADMIN — SODIUM CHLORIDE 1000 ML: 9 INJECTION, SOLUTION INTRAVENOUS at 23:59

## 2022-10-19 ENCOUNTER — APPOINTMENT (OUTPATIENT)
Dept: CT IMAGING | Age: 77
DRG: 853 | End: 2022-10-19
Attending: EMERGENCY MEDICINE
Payer: MEDICARE

## 2022-10-19 ENCOUNTER — APPOINTMENT (OUTPATIENT)
Dept: GENERAL RADIOLOGY | Age: 77
DRG: 853 | End: 2022-10-19
Attending: UROLOGY
Payer: MEDICARE

## 2022-10-19 ENCOUNTER — ANESTHESIA (OUTPATIENT)
Dept: SURGERY | Age: 77
DRG: 853 | End: 2022-10-19
Payer: MEDICARE

## 2022-10-19 ENCOUNTER — APPOINTMENT (OUTPATIENT)
Dept: GENERAL RADIOLOGY | Age: 77
DRG: 853 | End: 2022-10-19
Attending: EMERGENCY MEDICINE
Payer: MEDICARE

## 2022-10-19 ENCOUNTER — ANESTHESIA EVENT (OUTPATIENT)
Dept: SURGERY | Age: 77
DRG: 853 | End: 2022-10-19
Payer: MEDICARE

## 2022-10-19 PROBLEM — N20.1 URETERAL STONE: Status: ACTIVE | Noted: 2022-10-19

## 2022-10-19 LAB
ALBUMIN SERPL-MCNC: 2.8 G/DL (ref 3.5–5)
ALBUMIN SERPL-MCNC: 3.7 G/DL (ref 3.5–5)
ALBUMIN/GLOB SERPL: 0.9 {RATIO} (ref 1.1–2.2)
ALBUMIN/GLOB SERPL: 1 {RATIO} (ref 1.1–2.2)
ALP SERPL-CCNC: 48 U/L (ref 45–117)
ALP SERPL-CCNC: 74 U/L (ref 45–117)
ALT SERPL-CCNC: 14 U/L (ref 12–78)
ALT SERPL-CCNC: 23 U/L (ref 12–78)
ANION GAP SERPL CALC-SCNC: 13 MMOL/L (ref 5–15)
ANION GAP SERPL CALC-SCNC: 8 MMOL/L (ref 5–15)
APPEARANCE UR: ABNORMAL
AST SERPL-CCNC: 18 U/L (ref 15–37)
AST SERPL-CCNC: 37 U/L (ref 15–37)
ATRIAL RATE: 113 BPM
BACTERIA URNS QL MICRO: ABNORMAL /HPF
BASOPHILS # BLD: 0.1 K/UL (ref 0–0.1)
BASOPHILS NFR BLD: 1 % (ref 0–1)
BILIRUB SERPL-MCNC: 0.4 MG/DL (ref 0.2–1)
BILIRUB SERPL-MCNC: 1.1 MG/DL (ref 0.2–1)
BILIRUB UR QL: NEGATIVE
BUN SERPL-MCNC: 20 MG/DL (ref 6–20)
BUN SERPL-MCNC: 21 MG/DL (ref 6–20)
BUN/CREAT SERPL: 11 (ref 12–20)
BUN/CREAT SERPL: 13 (ref 12–20)
CALCIUM SERPL-MCNC: 7.8 MG/DL (ref 8.5–10.1)
CALCIUM SERPL-MCNC: 9.2 MG/DL (ref 8.5–10.1)
CALCULATED P AXIS, ECG09: 20 DEGREES
CALCULATED R AXIS, ECG10: 85 DEGREES
CALCULATED T AXIS, ECG11: 62 DEGREES
CHLORIDE SERPL-SCNC: 105 MMOL/L (ref 97–108)
CHLORIDE SERPL-SCNC: 113 MMOL/L (ref 97–108)
CO2 SERPL-SCNC: 18 MMOL/L (ref 21–32)
CO2 SERPL-SCNC: 22 MMOL/L (ref 21–32)
COLOR UR: ABNORMAL
COMMENT, HOLDF: NORMAL
COVID-19 RAPID TEST, COVR: NOT DETECTED
CREAT SERPL-MCNC: 1.57 MG/DL (ref 0.55–1.02)
CREAT SERPL-MCNC: 1.95 MG/DL (ref 0.55–1.02)
DIAGNOSIS, 93000: NORMAL
DIFFERENTIAL METHOD BLD: ABNORMAL
EOSINOPHIL # BLD: 0 K/UL (ref 0–0.4)
EOSINOPHIL NFR BLD: 0 % (ref 0–7)
EPITH CASTS URNS QL MICRO: ABNORMAL /LPF
ERYTHROCYTE [DISTWIDTH] IN BLOOD BY AUTOMATED COUNT: 12.7 % (ref 11.5–14.5)
ERYTHROCYTE [DISTWIDTH] IN BLOOD BY AUTOMATED COUNT: 12.8 % (ref 11.5–14.5)
FLUAV AG NPH QL IA: NEGATIVE
FLUBV AG NOSE QL IA: NEGATIVE
GLOBULIN SER CALC-MCNC: 2.8 G/DL (ref 2–4)
GLOBULIN SER CALC-MCNC: 4 G/DL (ref 2–4)
GLUCOSE SERPL-MCNC: 131 MG/DL (ref 65–100)
GLUCOSE SERPL-MCNC: 137 MG/DL (ref 65–100)
GLUCOSE UR STRIP.AUTO-MCNC: NEGATIVE MG/DL
HCT VFR BLD AUTO: 39.9 % (ref 35–47)
HCT VFR BLD AUTO: 48.3 % (ref 35–47)
HGB BLD-MCNC: 13.4 G/DL (ref 11.5–16)
HGB BLD-MCNC: 16 G/DL (ref 11.5–16)
HGB UR QL STRIP: ABNORMAL
IMM GRANULOCYTES # BLD AUTO: 0.1 K/UL (ref 0–0.04)
IMM GRANULOCYTES NFR BLD AUTO: 1 % (ref 0–0.5)
INR PPP: 1.2 (ref 0.9–1.1)
KETONES UR QL STRIP.AUTO: NEGATIVE MG/DL
LACTATE BLD-SCNC: 2.08 MMOL/L (ref 0.4–2)
LEUKOCYTE ESTERASE UR QL STRIP.AUTO: ABNORMAL
LYMPHOCYTES # BLD: 0.3 K/UL (ref 0.8–3.5)
LYMPHOCYTES NFR BLD: 2 % (ref 12–49)
MCH RBC QN AUTO: 31.6 PG (ref 26–34)
MCH RBC QN AUTO: 32.6 PG (ref 26–34)
MCHC RBC AUTO-ENTMCNC: 33.1 G/DL (ref 30–36.5)
MCHC RBC AUTO-ENTMCNC: 33.6 G/DL (ref 30–36.5)
MCV RBC AUTO: 95.3 FL (ref 80–99)
MCV RBC AUTO: 97.1 FL (ref 80–99)
MONOCYTES # BLD: 0.1 K/UL (ref 0–1)
MONOCYTES NFR BLD: 1 % (ref 5–13)
NEUTS SEG # BLD: 14.2 K/UL (ref 1.8–8)
NEUTS SEG NFR BLD: 95 % (ref 32–75)
NITRITE UR QL STRIP.AUTO: POSITIVE
NRBC # BLD: 0 K/UL (ref 0–0.01)
NRBC # BLD: 0 K/UL (ref 0–0.01)
NRBC BLD-RTO: 0 PER 100 WBC
NRBC BLD-RTO: 0 PER 100 WBC
P-R INTERVAL, ECG05: 130 MS
PH UR STRIP: 6 [PH] (ref 5–8)
PLATELET # BLD AUTO: 157 K/UL (ref 150–400)
PLATELET # BLD AUTO: 198 K/UL (ref 150–400)
PMV BLD AUTO: 10.4 FL (ref 8.9–12.9)
PMV BLD AUTO: 10.9 FL (ref 8.9–12.9)
POTASSIUM SERPL-SCNC: 3.6 MMOL/L (ref 3.5–5.1)
POTASSIUM SERPL-SCNC: 4.4 MMOL/L (ref 3.5–5.1)
PROT SERPL-MCNC: 5.6 G/DL (ref 6.4–8.2)
PROT SERPL-MCNC: 7.7 G/DL (ref 6.4–8.2)
PROT UR STRIP-MCNC: 100 MG/DL
PROTHROMBIN TIME: 12 SEC (ref 9–11.1)
Q-T INTERVAL, ECG07: 352 MS
QRS DURATION, ECG06: 76 MS
QTC CALCULATION (BEZET), ECG08: 482 MS
RBC # BLD AUTO: 4.11 M/UL (ref 3.8–5.2)
RBC # BLD AUTO: 5.07 M/UL (ref 3.8–5.2)
RBC #/AREA URNS HPF: >100 /HPF (ref 0–5)
RBC MORPH BLD: ABNORMAL
SAMPLES BEING HELD,HOLD: NORMAL
SODIUM SERPL-SCNC: 136 MMOL/L (ref 136–145)
SODIUM SERPL-SCNC: 143 MMOL/L (ref 136–145)
SOURCE, COVRS: NORMAL
SP GR UR REFRACTOMETRY: 1.01
TROPONIN-HIGH SENSITIVITY: 16 NG/L (ref 0–51)
UA: UC IF INDICATED,UAUC: ABNORMAL
UROBILINOGEN UR QL STRIP.AUTO: 1 EU/DL (ref 0.2–1)
VENTRICULAR RATE, ECG03: 113 BPM
WBC # BLD AUTO: 14.8 K/UL (ref 3.6–11)
WBC # BLD AUTO: 23 K/UL (ref 3.6–11)
WBC URNS QL MICRO: >100 /HPF (ref 0–4)

## 2022-10-19 PROCEDURE — 2709999900 HC NON-CHARGEABLE SUPPLY: Performed by: UROLOGY

## 2022-10-19 PROCEDURE — 87804 INFLUENZA ASSAY W/OPTIC: CPT

## 2022-10-19 PROCEDURE — 85610 PROTHROMBIN TIME: CPT

## 2022-10-19 PROCEDURE — C1769 GUIDE WIRE: HCPCS | Performed by: UROLOGY

## 2022-10-19 PROCEDURE — 97530 THERAPEUTIC ACTIVITIES: CPT

## 2022-10-19 PROCEDURE — 87150 DNA/RNA AMPLIFIED PROBE: CPT

## 2022-10-19 PROCEDURE — 74011250636 HC RX REV CODE- 250/636: Performed by: NURSE ANESTHETIST, CERTIFIED REGISTERED

## 2022-10-19 PROCEDURE — 76210000016 HC OR PH I REC 1 TO 1.5 HR: Performed by: UROLOGY

## 2022-10-19 PROCEDURE — 74011250636 HC RX REV CODE- 250/636: Performed by: EMERGENCY MEDICINE

## 2022-10-19 PROCEDURE — 85027 COMPLETE CBC AUTOMATED: CPT

## 2022-10-19 PROCEDURE — 83605 ASSAY OF LACTIC ACID: CPT

## 2022-10-19 PROCEDURE — 77030013079 HC BLNKT BAIR HGGR 3M -A: Performed by: NURSE ANESTHETIST, CERTIFIED REGISTERED

## 2022-10-19 PROCEDURE — 77010033678 HC OXYGEN DAILY

## 2022-10-19 PROCEDURE — 87186 SC STD MICRODIL/AGAR DIL: CPT

## 2022-10-19 PROCEDURE — 74011000250 HC RX REV CODE- 250: Performed by: STUDENT IN AN ORGANIZED HEALTH CARE EDUCATION/TRAINING PROGRAM

## 2022-10-19 PROCEDURE — 65270000046 HC RM TELEMETRY

## 2022-10-19 PROCEDURE — 81001 URINALYSIS AUTO W/SCOPE: CPT

## 2022-10-19 PROCEDURE — 74011250636 HC RX REV CODE- 250/636: Performed by: STUDENT IN AN ORGANIZED HEALTH CARE EDUCATION/TRAINING PROGRAM

## 2022-10-19 PROCEDURE — 74011000258 HC RX REV CODE- 258: Performed by: EMERGENCY MEDICINE

## 2022-10-19 PROCEDURE — 74011250637 HC RX REV CODE- 250/637: Performed by: EMERGENCY MEDICINE

## 2022-10-19 PROCEDURE — 74011000250 HC RX REV CODE- 250: Performed by: NURSE ANESTHETIST, CERTIFIED REGISTERED

## 2022-10-19 PROCEDURE — 87077 CULTURE AEROBIC IDENTIFY: CPT

## 2022-10-19 PROCEDURE — 97535 SELF CARE MNGMENT TRAINING: CPT

## 2022-10-19 PROCEDURE — 74420 UROGRAPHY RTRGR +-KUB: CPT

## 2022-10-19 PROCEDURE — 74011000636 HC RX REV CODE- 636: Performed by: UROLOGY

## 2022-10-19 PROCEDURE — 97116 GAIT TRAINING THERAPY: CPT

## 2022-10-19 PROCEDURE — 97161 PT EVAL LOW COMPLEX 20 MIN: CPT

## 2022-10-19 PROCEDURE — 71045 X-RAY EXAM CHEST 1 VIEW: CPT

## 2022-10-19 PROCEDURE — 76010000138 HC OR TIME 0.5 TO 1 HR: Performed by: UROLOGY

## 2022-10-19 PROCEDURE — 77030026438 HC STYL ET INTUB CARD -A: Performed by: NURSE ANESTHETIST, CERTIFIED REGISTERED

## 2022-10-19 PROCEDURE — 94760 N-INVAS EAR/PLS OXIMETRY 1: CPT

## 2022-10-19 PROCEDURE — 96365 THER/PROPH/DIAG IV INF INIT: CPT

## 2022-10-19 PROCEDURE — 87635 SARS-COV-2 COVID-19 AMP PRB: CPT

## 2022-10-19 PROCEDURE — 80053 COMPREHEN METABOLIC PANEL: CPT

## 2022-10-19 PROCEDURE — 0T788DZ DILATION OF BILATERAL URETERS WITH INTRALUMINAL DEVICE, VIA NATURAL OR ARTIFICIAL OPENING ENDOSCOPIC: ICD-10-PCS | Performed by: UROLOGY

## 2022-10-19 PROCEDURE — C2617 STENT, NON-COR, TEM W/O DEL: HCPCS | Performed by: UROLOGY

## 2022-10-19 PROCEDURE — 76060000032 HC ANESTHESIA 0.5 TO 1 HR: Performed by: UROLOGY

## 2022-10-19 PROCEDURE — 74176 CT ABD & PELVIS W/O CONTRAST: CPT

## 2022-10-19 PROCEDURE — 74011250637 HC RX REV CODE- 250/637: Performed by: INTERNAL MEDICINE

## 2022-10-19 PROCEDURE — 97165 OT EVAL LOW COMPLEX 30 MIN: CPT

## 2022-10-19 PROCEDURE — C1758 CATHETER, URETERAL: HCPCS | Performed by: UROLOGY

## 2022-10-19 PROCEDURE — 87086 URINE CULTURE/COLONY COUNT: CPT

## 2022-10-19 PROCEDURE — 77030012961 HC IRR KT CYSTO/TUR ICUM -A: Performed by: UROLOGY

## 2022-10-19 PROCEDURE — 77030008684 HC TU ET CUF COVD -B: Performed by: NURSE ANESTHETIST, CERTIFIED REGISTERED

## 2022-10-19 DEVICE — URETERAL STENT
Type: IMPLANTABLE DEVICE | Site: URETER | Status: FUNCTIONAL
Brand: POLARIS™ ULTRA

## 2022-10-19 RX ORDER — OXYCODONE HYDROCHLORIDE 5 MG/1
5 TABLET ORAL AS NEEDED
Status: DISCONTINUED | OUTPATIENT
Start: 2022-10-19 | End: 2022-10-19 | Stop reason: HOSPADM

## 2022-10-19 RX ORDER — FENTANYL CITRATE 50 UG/ML
INJECTION, SOLUTION INTRAMUSCULAR; INTRAVENOUS AS NEEDED
Status: DISCONTINUED | OUTPATIENT
Start: 2022-10-19 | End: 2022-10-19 | Stop reason: HOSPADM

## 2022-10-19 RX ORDER — ONDANSETRON 2 MG/ML
4 INJECTION INTRAMUSCULAR; INTRAVENOUS AS NEEDED
Status: DISCONTINUED | OUTPATIENT
Start: 2022-10-19 | End: 2022-10-19 | Stop reason: HOSPADM

## 2022-10-19 RX ORDER — SODIUM CHLORIDE 9 MG/ML
75 INJECTION, SOLUTION INTRAVENOUS CONTINUOUS
Status: DISPENSED | OUTPATIENT
Start: 2022-10-19 | End: 2022-10-19

## 2022-10-19 RX ORDER — SODIUM CHLORIDE, SODIUM LACTATE, POTASSIUM CHLORIDE, CALCIUM CHLORIDE 600; 310; 30; 20 MG/100ML; MG/100ML; MG/100ML; MG/100ML
500 INJECTION, SOLUTION INTRAVENOUS ONCE
Status: COMPLETED | OUTPATIENT
Start: 2022-10-19 | End: 2022-10-19

## 2022-10-19 RX ORDER — LEVOTHYROXINE SODIUM 100 UG/1
100 TABLET ORAL
Status: DISCONTINUED | OUTPATIENT
Start: 2022-10-19 | End: 2022-10-22 | Stop reason: HOSPADM

## 2022-10-19 RX ORDER — PROPOFOL 10 MG/ML
INJECTION, EMULSION INTRAVENOUS AS NEEDED
Status: DISCONTINUED | OUTPATIENT
Start: 2022-10-19 | End: 2022-10-19 | Stop reason: HOSPADM

## 2022-10-19 RX ORDER — ATORVASTATIN CALCIUM 20 MG/1
20 TABLET, FILM COATED ORAL ONCE
Status: COMPLETED | OUTPATIENT
Start: 2022-10-19 | End: 2022-10-19

## 2022-10-19 RX ORDER — FENTANYL CITRATE 50 UG/ML
25 INJECTION, SOLUTION INTRAMUSCULAR; INTRAVENOUS
Status: DISCONTINUED | OUTPATIENT
Start: 2022-10-19 | End: 2022-10-19 | Stop reason: HOSPADM

## 2022-10-19 RX ORDER — ROCURONIUM BROMIDE 10 MG/ML
INJECTION, SOLUTION INTRAVENOUS AS NEEDED
Status: DISCONTINUED | OUTPATIENT
Start: 2022-10-19 | End: 2022-10-19 | Stop reason: HOSPADM

## 2022-10-19 RX ORDER — LIDOCAINE HYDROCHLORIDE 20 MG/ML
INJECTION, SOLUTION EPIDURAL; INFILTRATION; INTRACAUDAL; PERINEURAL AS NEEDED
Status: DISCONTINUED | OUTPATIENT
Start: 2022-10-19 | End: 2022-10-19 | Stop reason: HOSPADM

## 2022-10-19 RX ORDER — ATORVASTATIN CALCIUM 20 MG/1
20 TABLET, FILM COATED ORAL DAILY
Status: DISCONTINUED | OUTPATIENT
Start: 2022-10-19 | End: 2022-10-22 | Stop reason: HOSPADM

## 2022-10-19 RX ORDER — SODIUM CHLORIDE, SODIUM LACTATE, POTASSIUM CHLORIDE, CALCIUM CHLORIDE 600; 310; 30; 20 MG/100ML; MG/100ML; MG/100ML; MG/100ML
75 INJECTION, SOLUTION INTRAVENOUS CONTINUOUS
Status: DISCONTINUED | OUTPATIENT
Start: 2022-10-19 | End: 2022-10-19

## 2022-10-19 RX ORDER — ACETAMINOPHEN 325 MG/1
650 TABLET ORAL
Status: COMPLETED | OUTPATIENT
Start: 2022-10-19 | End: 2022-10-19

## 2022-10-19 RX ORDER — HYDROCODONE BITARTRATE AND ACETAMINOPHEN 5; 325 MG/1; MG/1
1 TABLET ORAL
Status: DISCONTINUED | OUTPATIENT
Start: 2022-10-19 | End: 2022-10-22 | Stop reason: HOSPADM

## 2022-10-19 RX ORDER — NOREPINEPHRINE BITARTRATE/D5W 8 MG/250ML
.5-16 PLASTIC BAG, INJECTION (ML) INTRAVENOUS
Status: DISCONTINUED | OUTPATIENT
Start: 2022-10-19 | End: 2022-10-19

## 2022-10-19 RX ORDER — ONDANSETRON 2 MG/ML
INJECTION INTRAMUSCULAR; INTRAVENOUS AS NEEDED
Status: DISCONTINUED | OUTPATIENT
Start: 2022-10-19 | End: 2022-10-19 | Stop reason: HOSPADM

## 2022-10-19 RX ORDER — DEXAMETHASONE SODIUM PHOSPHATE 4 MG/ML
INJECTION, SOLUTION INTRA-ARTICULAR; INTRALESIONAL; INTRAMUSCULAR; INTRAVENOUS; SOFT TISSUE AS NEEDED
Status: DISCONTINUED | OUTPATIENT
Start: 2022-10-19 | End: 2022-10-19 | Stop reason: HOSPADM

## 2022-10-19 RX ORDER — SODIUM CHLORIDE, SODIUM LACTATE, POTASSIUM CHLORIDE, CALCIUM CHLORIDE 600; 310; 30; 20 MG/100ML; MG/100ML; MG/100ML; MG/100ML
INJECTION, SOLUTION INTRAVENOUS
Status: DISCONTINUED | OUTPATIENT
Start: 2022-10-19 | End: 2022-10-19 | Stop reason: HOSPADM

## 2022-10-19 RX ORDER — HYDROMORPHONE HYDROCHLORIDE 1 MG/ML
0.5 INJECTION, SOLUTION INTRAMUSCULAR; INTRAVENOUS; SUBCUTANEOUS
Status: DISCONTINUED | OUTPATIENT
Start: 2022-10-19 | End: 2022-10-22 | Stop reason: HOSPADM

## 2022-10-19 RX ORDER — SUCCINYLCHOLINE CHLORIDE 20 MG/ML
INJECTION INTRAMUSCULAR; INTRAVENOUS AS NEEDED
Status: DISCONTINUED | OUTPATIENT
Start: 2022-10-19 | End: 2022-10-19 | Stop reason: HOSPADM

## 2022-10-19 RX ORDER — HYDROMORPHONE HYDROCHLORIDE 1 MG/ML
0.2 INJECTION, SOLUTION INTRAMUSCULAR; INTRAVENOUS; SUBCUTANEOUS
Status: DISCONTINUED | OUTPATIENT
Start: 2022-10-19 | End: 2022-10-19 | Stop reason: HOSPADM

## 2022-10-19 RX ADMIN — ROCURONIUM BROMIDE 5 MG: 10 INJECTION INTRAVENOUS at 03:29

## 2022-10-19 RX ADMIN — DEXAMETHASONE SODIUM PHOSPHATE 4 MG: 4 INJECTION, SOLUTION INTRAMUSCULAR; INTRAVENOUS at 03:35

## 2022-10-19 RX ADMIN — LIDOCAINE HYDROCHLORIDE 80 MG: 20 INJECTION, SOLUTION EPIDURAL; INFILTRATION; INTRACAUDAL; PERINEURAL at 03:29

## 2022-10-19 RX ADMIN — FAMOTIDINE 20 MG: 10 INJECTION, SOLUTION INTRAVENOUS at 09:17

## 2022-10-19 RX ADMIN — ACETAMINOPHEN 650 MG: 325 TABLET ORAL at 00:08

## 2022-10-19 RX ADMIN — SODIUM CHLORIDE, POTASSIUM CHLORIDE, SODIUM LACTATE AND CALCIUM CHLORIDE: 600; 310; 30; 20 INJECTION, SOLUTION INTRAVENOUS at 03:24

## 2022-10-19 RX ADMIN — FENTANYL CITRATE 25 MCG: 50 INJECTION, SOLUTION INTRAMUSCULAR; INTRAVENOUS at 03:46

## 2022-10-19 RX ADMIN — SODIUM CHLORIDE 1 G: 900 INJECTION INTRAVENOUS at 00:58

## 2022-10-19 RX ADMIN — SUCCINYLCHOLINE CHLORIDE 140 MG: 20 INJECTION, SOLUTION INTRAMUSCULAR; INTRAVENOUS at 03:29

## 2022-10-19 RX ADMIN — FAMOTIDINE 20 MG: 10 INJECTION, SOLUTION INTRAVENOUS at 20:35

## 2022-10-19 RX ADMIN — ATORVASTATIN CALCIUM 20 MG: 20 TABLET, FILM COATED ORAL at 22:40

## 2022-10-19 RX ADMIN — SODIUM CHLORIDE 75 ML/HR: 9 INJECTION, SOLUTION INTRAVENOUS at 04:33

## 2022-10-19 RX ADMIN — FENTANYL CITRATE 25 MCG: 50 INJECTION, SOLUTION INTRAMUSCULAR; INTRAVENOUS at 03:54

## 2022-10-19 RX ADMIN — SODIUM CHLORIDE, POTASSIUM CHLORIDE, SODIUM LACTATE AND CALCIUM CHLORIDE 500 ML: 600; 310; 30; 20 INJECTION, SOLUTION INTRAVENOUS at 04:31

## 2022-10-19 RX ADMIN — FENTANYL CITRATE 50 MCG: 50 INJECTION, SOLUTION INTRAMUSCULAR; INTRAVENOUS at 03:29

## 2022-10-19 RX ADMIN — ONDANSETRON HYDROCHLORIDE 4 MG: 2 INJECTION, SOLUTION INTRAMUSCULAR; INTRAVENOUS at 03:54

## 2022-10-19 RX ADMIN — PROPOFOL 100 MG: 10 INJECTION, EMULSION INTRAVENOUS at 03:29

## 2022-10-19 NOTE — H&P
Hospitalist Admission Note    NAME: Yokasta Solomon   :  1945   MRN:  [id number]     Date/Time:  10/19/2022 10:18 AM    Patient PCP: Pushpa Pretty NP  ______________________________________________________________________  Given the patient's current clinical presentation, I have a high level of concern for decompensation if discharged from the emergency department. Complex decision making was performed, which includes reviewing the patient's available past medical records, laboratory results, and x-ray films. My assessment of this patient's clinical condition and my plan of care is as follows. Assessment / Plan:  Bilateral hydronephrosis s/p cystoscopy and stent placement  UTI suspect gram-negative organism, complicated  Severe sepsis  -Patient underwent cystoscopy with placement.  -Follow urine culture results. Continue with  -Received bolus IV fluids. Lactic acid trended down  -Check CBC in AM.  Neurology following and appreciate recommendations    Hypothyroidism  Dyslipidemia  -Continue levothyroxine and statin      Code Status: Full code  Surrogate Decision Maker: Daughter    DVT Prophylaxis: SCDs  GI Prophylaxis: not indicated    Baseline: From home, independent of ADLs      Subjective:   CHIEF COMPLAINT: Chills, right flank pain    HISTORY OF PRESENT ILLNESS:     Yokasta Solomon is a 65 y.o.   female who presents with past medical history of hypothyroidism, dyslipidemia is coming the hospital with complaints of right flank pain, nausea vomiting and also fever. Patient was being ultrafiltered about 2 days ago when she started having the symptoms. She reports right flank pain is about 5 x 10, increases with movement and without any relieving factors. She reports nausea along with vomiting and is not able to keep anything down. On arrival to ED, she was noted to have leukocytosis, lactic acid was elevated.   She had a CT abdomen which showed evidence of a 6 mm right mid ureter with hydronephrosis and also left-sided hydronephrosis for which urology was consulted and patient underwent a bilateral stent placement. We were asked to admit for work up and evaluation of the above problems. Past medical history  Hypothyroidism  Dyslipidemia    Past surgical history  Cystoscopy with stent placement    Social History     Tobacco Use    Smoking status: Not on file    Smokeless tobacco: Not on file   Substance Use Topics    Alcohol use: Not on file   Does not report any alcohol or smoking    Family history  No CAD  Allergies   Allergen Reactions    Sulfa (Sulfonamide Antibiotics) Rash        Prior to Admission medications    Medication Sig Start Date End Date Taking? Authorizing Provider   simvastatin (ZOCOR) 40 mg tablet Take 40 mg by mouth nightly. Yes Other, MD Mart   levothyroxine (SYNTHROID) 100 mcg tablet Take 100 mcg by mouth Daily (before breakfast). Yes Other, MD Mart       REVIEW OF SYSTEMS:     I am not able to complete the review of systems because:    The patient is intubated and sedated    The patient has altered mental status due to his acute medical problems    The patient has baseline aphasia from prior stroke(s)    The patient has baseline dementia and is not reliable historian    The patient is in acute medical distress and unable to provide information           Total of 12 systems reviewed as follows:       POSITIVE= underlined text  Negative = text not underlined  General:  fever, chills, sweats, generalized weakness, weight loss/gain,      loss of appetite   Eyes:    blurred vision, eye pain, loss of vision, double vision  ENT:    rhinorrhea, pharyngitis   Respiratory:   cough, sputum production, SOB, NICHOLSON, wheezing, pleuritic pain   Cardiology:   chest pain, palpitations, orthopnea, PND, edema, syncope   Gastrointestinal:  abdominal pain , N/V, diarrhea, dysphagia, constipation, bleeding   Genitourinary:  frequency, urgency, dysuria, hematuria, incontinence   Muskuloskeletal :  arthralgia, myalgia, back pain  Hematology:  easy bruising, nose or gum bleeding, lymphadenopathy   Dermatological: rash, ulceration, pruritis, color change / jaundice  Endocrine:   hot flashes or polydipsia   Neurological:  headache, dizziness, confusion, focal weakness, paresthesia,     Speech difficulties, memory loss, gait difficulty  Psychological: Feelings of anxiety, depression, agitation    Objective:   VITALS:    Visit Vitals  /62   Pulse 63   Temp 98 °F (36.7 °C)   Resp 18   Ht 5' 4\" (1.626 m)   Wt 63 kg (139 lb)   SpO2 99%   BMI 23.86 kg/m²       PHYSICAL EXAM:    General:    Alert, cooperative, no distress, appears stated age. HEENT: Atraumatic, anicteric sclerae, pink conjunctivae     No oral ulcers, mucosa moist, throat clear, dentition fair  Neck:  Supple, symmetrical,  thyroid: non tender  Lungs:   Clear to auscultation bilaterally. No Wheezing or Rhonchi. No rales. Chest wall:  No tenderness  No Accessory muscle use. Heart:   Regular  rhythm,  No  murmur   No edema  Abdomen:   Soft, left flank tenderness, no guarding  Extremities: No cyanosis. No clubbing,      Skin turgor normal, Capillary refill normal, Radial dial pulse 2+  Skin:     Not pale. Not Jaundiced  No rashes   Psych:  Good insight. Not depressed. Not anxious or agitated. Neurologic: EOMs intact. No facial asymmetry. No aphasia or slurred speech. Symmetrical strength, Sensation grossly intact.  Alert and oriented X 4.     _______________________________________________________________________  Care Plan discussed with:    Comments   Patient y    Family      RN y    Care Manager                    Consultant:      _______________________________________________________________________  Expected  Disposition:   Home with Family y   HH/PT/OT/RN    SNF/LTC    AVA    ________________________________________________________________________  TOTAL TIME:  61  Minutes    Critical Care Provided Minutes non procedure based      Comments    y Reviewed previous records   >50% of visit spent in counseling and coordination of care y Discussion with patient and/or family and questions answered       ________________________________________________________________________  Signed: Jaqueline Sitll MD    Procedures: see electronic medical records for all procedures/Xrays and details which were not copied into this note but were reviewed prior to creation of Plan. LAB DATA REVIEWED:    Recent Results (from the past 24 hour(s))   EKG, 12 LEAD, INITIAL    Collection Time: 10/18/22 11:28 PM   Result Value Ref Range    Ventricular Rate 113 BPM    Atrial Rate 113 BPM    P-R Interval 130 ms    QRS Duration 76 ms    Q-T Interval 352 ms    QTC Calculation (Bezet) 482 ms    Calculated P Axis 20 degrees    Calculated R Axis 85 degrees    Calculated T Axis 62 degrees    Diagnosis       Sinus tachycardia  Nonspecific ST abnormality  No previous ECGs available     CULTURE, BLOOD, PAIRED    Collection Time: 10/18/22 11:45 PM    Specimen: Blood   Result Value Ref Range    Special Requests: NO SPECIAL REQUESTS      Culture result: NO GROWTH AFTER 6 HOURS     METABOLIC PANEL, COMPREHENSIVE    Collection Time: 10/18/22 11:51 PM   Result Value Ref Range    Sodium 136 136 - 145 mmol/L    Potassium 4.4 3.5 - 5.1 mmol/L    Chloride 105 97 - 108 mmol/L    CO2 18 (L) 21 - 32 mmol/L    Anion gap 13 5 - 15 mmol/L    Glucose 131 (H) 65 - 100 mg/dL    BUN 21 (H) 6 - 20 MG/DL    Creatinine 1.95 (H) 0.55 - 1.02 MG/DL    BUN/Creatinine ratio 11 (L) 12 - 20      eGFR 26 (L) >60 ml/min/1.73m2    Calcium 9.2 8.5 - 10.1 MG/DL    Bilirubin, total 1.1 (H) 0.2 - 1.0 MG/DL    ALT (SGPT) 23 12 - 78 U/L    AST (SGOT) 37 15 - 37 U/L    Alk.  phosphatase 74 45 - 117 U/L    Protein, total 7.7 6.4 - 8.2 g/dL    Albumin 3.7 3.5 - 5.0 g/dL    Globulin 4.0 2.0 - 4.0 g/dL    A-G Ratio 0.9 (L) 1.1 - 2.2     CBC WITH AUTOMATED DIFF    Collection Time: 10/18/22 11:51 PM   Result Value Ref Range    WBC 14.8 (H) 3.6 - 11.0 K/uL    RBC 5.07 3.80 - 5.20 M/uL    HGB 16.0 11.5 - 16.0 g/dL    HCT 48.3 (H) 35.0 - 47.0 %    MCV 95.3 80.0 - 99.0 FL    MCH 31.6 26.0 - 34.0 PG    MCHC 33.1 30.0 - 36.5 g/dL    RDW 12.8 11.5 - 14.5 %    PLATELET 271 521 - 987 K/uL    MPV 10.9 8.9 - 12.9 FL    NRBC 0.0 0  WBC    ABSOLUTE NRBC 0.00 0.00 - 0.01 K/uL    NEUTROPHILS 95 (H) 32 - 75 %    LYMPHOCYTES 2 (L) 12 - 49 %    MONOCYTES 1 (L) 5 - 13 %    EOSINOPHILS 0 0 - 7 %    BASOPHILS 1 0 - 1 %    IMMATURE GRANULOCYTES 1 (H) 0.0 - 0.5 %    ABS. NEUTROPHILS 14.2 (H) 1.8 - 8.0 K/UL    ABS. LYMPHOCYTES 0.3 (L) 0.8 - 3.5 K/UL    ABS. MONOCYTES 0.1 0.0 - 1.0 K/UL    ABS. EOSINOPHILS 0.0 0.0 - 0.4 K/UL    ABS. BASOPHILS 0.1 0.0 - 0.1 K/UL    ABS. IMM. GRANS. 0.1 (H) 0.00 - 0.04 K/UL    DF SMEAR SCANNED      RBC COMMENTS NORMOCYTIC, NORMOCHROMIC     SAMPLES BEING HELD    Collection Time: 10/18/22 11:51 PM   Result Value Ref Range    SAMPLES BEING HELD PST     COMMENT        Add-on orders for these samples will be processed based on acceptable specimen integrity and analyte stability, which may vary by analyte.    TROPONIN-HIGH SENSITIVITY    Collection Time: 10/18/22 11:51 PM   Result Value Ref Range    Troponin-High Sensitivity 16 0 - 51 ng/L   POC LACTIC ACID    Collection Time: 10/18/22 11:55 PM   Result Value Ref Range    Lactic Acid (POC) 5.17 (HH) 0.40 - 2.00 mmol/L   URINALYSIS W/ REFLEX CULTURE    Collection Time: 10/19/22 12:47 AM    Specimen: Urine   Result Value Ref Range    Color DARK YELLOW      Appearance TURBID (A) CLEAR      Specific gravity 1.011      pH (UA) 6.0 5.0 - 8.0      Protein 100 (A) NEG mg/dL    Glucose Negative NEG mg/dL    Ketone Negative NEG mg/dL    Bilirubin Negative NEG      Blood LARGE (A) NEG      Urobilinogen 1.0 0.2 - 1.0 EU/dL    Nitrites Positive (A) NEG      Leukocyte Esterase LARGE (A) NEG      WBC >100 (H) 0 - 4 /hpf    RBC >100 (H) 0 - 5 /hpf    Epithelial cells MODERATE (A) FEW /lpf    Bacteria 3+ (A) NEG /hpf    UA:UC IF INDICATED URINE CULTURE ORDERED (A) CNI     COVID-19 RAPID TEST    Collection Time: 10/19/22 12:47 AM   Result Value Ref Range    Specimen source Nasopharyngeal      COVID-19 rapid test Not detected NOTD     INFLUENZA A+B VIRAL AGS    Collection Time: 10/19/22 12:47 AM   Result Value Ref Range    Influenza A Antigen Negative NEG      Influenza B Antigen Negative NEG     POC LACTIC ACID    Collection Time: 10/19/22  2:22 AM   Result Value Ref Range    Lactic Acid (POC) 2.08 (HH) 0.40 - 2.00 mmol/L   CBC W/O DIFF    Collection Time: 10/19/22  4:48 AM   Result Value Ref Range    WBC 23.0 (H) 3.6 - 11.0 K/uL    RBC 4.11 3.80 - 5.20 M/uL    HGB 13.4 11.5 - 16.0 g/dL    HCT 39.9 35.0 - 47.0 %    MCV 97.1 80.0 - 99.0 FL    MCH 32.6 26.0 - 34.0 PG    MCHC 33.6 30.0 - 36.5 g/dL    RDW 12.7 11.5 - 14.5 %    PLATELET 012 413 - 994 K/uL    MPV 10.4 8.9 - 12.9 FL    NRBC 0.0 0  WBC    ABSOLUTE NRBC 0.00 0.00 - 4.70 K/uL   METABOLIC PANEL, COMPREHENSIVE    Collection Time: 10/19/22  4:48 AM   Result Value Ref Range    Sodium 143 136 - 145 mmol/L    Potassium 3.6 3.5 - 5.1 mmol/L    Chloride 113 (H) 97 - 108 mmol/L    CO2 22 21 - 32 mmol/L    Anion gap 8 5 - 15 mmol/L    Glucose 137 (H) 65 - 100 mg/dL    BUN 20 6 - 20 MG/DL    Creatinine 1.57 (H) 0.55 - 1.02 MG/DL    BUN/Creatinine ratio 13 12 - 20      eGFR 34 (L) >60 ml/min/1.73m2    Calcium 7.8 (L) 8.5 - 10.1 MG/DL    Bilirubin, total 0.4 0.2 - 1.0 MG/DL    ALT (SGPT) 14 12 - 78 U/L    AST (SGOT) 18 15 - 37 U/L    Alk.  phosphatase 48 45 - 117 U/L    Protein, total 5.6 (L) 6.4 - 8.2 g/dL    Albumin 2.8 (L) 3.5 - 5.0 g/dL    Globulin 2.8 2.0 - 4.0 g/dL    A-G Ratio 1.0 (L) 1.1 - 2.2     PROTHROMBIN TIME + INR    Collection Time: 10/19/22  4:48 AM   Result Value Ref Range    INR 1.2 (H) 0.9 - 1.1      Prothrombin time 12.0 (H) 9.0 - 11.1 sec

## 2022-10-19 NOTE — PROGRESS NOTES
Care Management Interventions  PCP Verified by CM: Yes Alexia Montejo NP)  Palliative Care Criteria Met (RRAT>21 & CHF Dx)?: No (No MD order for Palliative Care)  Transition of Care Consult (CM Consult): Discharge Planning  Discharge Durable Medical Equipment: No  Physical Therapy Consult: No  Occupational Therapy Consult: No  Speech Therapy Consult: No  Support Systems: Child(david)  Confirm Follow Up Transport: Family  [employer] Information Provided?: No  Discharge Location  Patient Expects to be Discharged to[de-identified] Home     TELEPHONE CARE MANAGEMENT INTERVIEW:    Spoke with the patient's daughter, at bedside, regarding plan of care and disposition needs. Patient present as well. Patient's daughter confirmed demographics insurance and emergency contact on file. Patient lives with her daughter in a 2 story home with 4 steps but patient is on the first floor. At baseline the patient is independent with ADLs and driving. Does not use any type of assistive devices for ambulation. Patient is inpatient status. 1st IMM notification was obtained by Cramella Lewis on 10/18/22. Patient does not have an Advanced Directive on file but she states she has one. She has named her daughter, Arelis Rosado as her [address]. Encouraged her to provide a copy to the hospital if possible. Patient's daughter does not think that they will have any needs at discharge. Encouraged her to call or contact Care Manager with any issues, concerns, questions or needs.      Reason for Admission:  Ureteral Stones/ Emergent bilateral stent placement                   RUR Score:   10% LOW                  Plan for utilizing home health:    Not at this time      PCP: First and Last name:  Jun Turpin NP    Name of Practice:  [address] [place] [address]  Are you a current patient: Yes/No:  YES  Approximate date of last visit:  End of Septembe                    Current Advanced Directive/Advance Care Plan: Prior      Healthcare Decision Maker:   Click here to complete [address] including selection of the Healthcare Decision Maker Relationship (ie \"Primary\")             Primary Decision MakerStmartin Quinonez - Daughter - [phone]                  Transition of Care Plan:     Home. No needs identified by daughter at this time. Advance Care Planning     General Advance Care Planning (ACP) Conversation      Date of Conversation: 10/18/2022  Conducted with: Patient with Decision Making Capacity    Healthcare Decision Maker:     Primary Decision Maker: Moises Hyun - Daughter - [phone]  Click here to complete [address] including selection of the Healthcare Decision Maker Relationship (ie \"Primary\")    Today we documented Decision Maker(s) consistent with Legal Next of Kin hierarchy. Content/Action Overview:    Has ACP document(s) NOT on file - requested patient to provide  Reviewed DNR/DNI and patient     Additional Comments: Encouraged to provide the facility with a copy of her Advance Directive      Length of Voluntary ACP Conversation in minutes:  30 minutes    Aj Jensen

## 2022-10-19 NOTE — PROGRESS NOTES
Problem: Mobility Impaired (Adult and Pediatric)  Goal: *Acute Goals and Plan of Care (Insert Text)  Description: FUNCTIONAL STATUS PRIOR TO ADMISSION: Patient was independent and active without use of DME.    HOME SUPPORT PRIOR TO ADMISSION: The patient lived with family but did not require assist.    Physical Therapy Goals  Initiated 10/19/2022  1. Patient will move from supine to sit and sit to supine  in bed with independence within 7 day(s). 2.  Patient will transfer from bed to chair and chair to bed with independence using the least restrictive device within 7 day(s). 3.  Patient will perform sit to stand with independence within 7 day(s). 4.  Patient will ambulate with independence for 120 feet with the least restrictive device within 7 day(s). 5.  Patient will ascend/descend 5 stairs with 1 handrail(s) with modified independence within 7 day(s). Outcome: Progressing Towards Goal   PHYSICAL THERAPY EVALUATION  Patient: Parrish Martin (56 y.o. female)  Date: 10/19/2022  Primary Diagnosis: UTI (urinary tract infection) [N39.0]  Ureteral stone [N20.1]  Procedure(s) (LRB):  CYSTOSCOPY BILATERAL URETERAL STENT INSERTION (Bilateral) Day of Surgery   Precautions:        ASSESSMENT  Based on the objective data described below, the patient presents with decreased independence with functional mobility s/p bilateral ureteral stent insertion. She demonstrates stable BP with positional changes. Patient is able to transition supine to sit with HOB elevated. She demonstrates fair dynamic standing balance provided CGA. Patient requires VC in order to prevent from tangling in lines during gait and transfers. She is left in bedside chair with all needs met. Patient would benefit from 1-2 more gait training session in order to maintain independence. Current Level of Function Impacting Discharge (mobility/balance): CGA    Functional Outcome Measure: The patient scored 16/28 on the Tinetti outcome measure. Other factors to consider for discharge: medical stability, decreased balance, decreased independence with functional mobility      Patient will benefit from skilled therapy intervention to address the above noted impairments. PLAN :  Recommendations and Planned Interventions: bed mobility training, transfer training, gait training, therapeutic exercises, neuromuscular re-education, edema management/control, patient and family training/education, and therapeutic activities      Frequency/Duration: Patient will be followed by physical therapy:  4 times a week to address goals. Recommendation for discharge: (in order for the patient to meet his/her long term goals)  Physical therapy at least 2 days/week in the home v. None     This discharge recommendation:  Has been made in collaboration with the attending provider and/or case management    IF patient discharges home will need the following DME: to be determined (TBD)         SUBJECTIVE:   Patient stated I think I'm doing ok.     OBJECTIVE DATA SUMMARY:   HISTORY:    No past medical history on file. No past surgical history on file. Personal factors and/or comorbidities impacting plan of care:     Home Situation  Home Environment: Private residence  One/Two Story Residence: Two story, live on 1st floor  Living Alone: No  Support Systems: Child(david), Other Family Member(s)  Patient Expects to be Discharged to[de-identified] Home  Current DME Used/Available at Home: None  Tub or Shower Type: Tub/Shower combination    EXAMINATION/PRESENTATION/DECISION MAKING:   Critical Behavior:  Neurologic State: Alert  Orientation Level: Oriented X4  Cognition: Follows commands, Appropriate decision making, Appropriate safety awareness     Hearing:   Auditory  Auditory Impairment: None  Skin:    Edema:   Range Of Motion:  AROM: Within functional limits           PROM: Within functional limits           Strength:    Strength: Generally decreased, functional                    Tone & Sensation:   Tone: Normal                              Coordination:  Coordination: Generally decreased, functional  Vision:   Corrective Lenses: Reading glasses  Functional Mobility:  Bed Mobility:     Supine to Sit: Bed Modified;Stand-by assistance     Scooting: Modified independent  Transfers:  Sit to Stand: Independent  Stand to Sit: Independent        Bed to Chair: Contact guard assistance              Balance:   Sitting: Intact  Standing: Intact  Standing - Static: Good  Standing - Dynamic : Good  Ambulation/Gait Training:  Distance (ft): 15 Feet (ft)  Assistive Device: Gait belt  Ambulation - Level of Assistance: Contact guard assistance        Gait Abnormalities: Decreased step clearance        Base of Support: Widened     Speed/Anne-Marie: Slow  Step Length: Right shortened;Left shortened                     Stairs: Therapeutic Exercises:       Functional Measure:  Tinetti test:    Sitting Balance: 1  Arises: 1  Attempts to Rise: 2  Immediate Standing Balance: 0  Standing Balance: 1  Nudged: 1  Eyes Closed: 0  Turn 360 Degrees - Continuous/Discontinuous: 1  Turn 360 Degrees - Steady/Unsteady: 0  Sitting Down: 1  Balance Score: 8 Balance total score  Indication of Gait: 1  R Step Length/Height: 1  L Step Length/Height: 1  R Foot Clearance: 1  L Foot Clearance: 1  Step Symmetry: 1  Step Continuity: 0  Path: 1  Trunk: 1  Walking Time: 0  Gait Score: 8 Gait total score  Total Score: 16/28 Overall total score         Tinetti Tool Score Risk of Falls  <19 = High Fall Risk  19-24 = Moderate Fall Risk  25-28 = Low Fall Risk  Tinetti ME. Performance-Oriented Assessment of Mobility Problems in Elderly Patients. Castaneda 65; [id number].  (Scoring Description: PT Bulletin Feb. 10, 1993)    Older adults: Lanre Lamas et al, 2009; n = 1000 [facility] elderly evaluated with ABC, MIKO, ADL, and IADL)  · Mean MIKO score for males aged 69-68 years = 26.21(3.40)  · Mean MIKO score for females age 69-68 years = 25.16(4.30)  · Mean MIKO score for males over 80 years = 23.29(6.02)  · Mean MIKO score for females over 80 years = 17.20(8.32)            Physical Therapy Evaluation Charge Determination   History Examination Presentation Decision-Making   MEDIUM  Complexity : 1-2 comorbidities / personal factors will impact the outcome/ POC  LOW Complexity : 1-2 Standardized tests and measures addressing body structure, function, activity limitation and / or participation in recreation  MEDIUM Complexity : Evolving with changing characteristics  Other outcome measures Tinetti  HIGH       Based on the above components, the patient evaluation is determined to be of the following complexity level: MEDIUM    Pain Rating:      Activity Tolerance:   Good    After treatment patient left in no apparent distress:   Sitting in chair and Call bell within reach    COMMUNICATION/EDUCATION:   The patients plan of care was discussed with: Registered nurse. Fall prevention education was provided and the patient/caregiver indicated understanding., Patient/family have participated as able in goal setting and plan of care. , and Patient/family agree to work toward stated goals and plan of care.     Thank you for this referral.  Lc Almeida, PT   Time Calculation: 22 mins

## 2022-10-19 NOTE — PROGRESS NOTES
OCCUPATIONAL THERAPY EVALUATION/DISCHARGE  Patient: Ar Beck (17 y.o. female)  Date: 10/19/2022  Primary Diagnosis: UTI (urinary tract infection) [N39.0]  Ureteral stone [N20.1]  Procedure(s) (LRB):  CYSTOSCOPY BILATERAL URETERAL STENT INSERTION (Bilateral) Day of Surgery   Precautions: None       ASSESSMENT  Pt presented sitting up right in chair. Pt motivated to work with therapy. Pt presents to be at her independent baseline for functional mobility and ADL/IADL completion. Pt completed functional transfers at a independent level and ambulates without the use of a RW. Pt did not state any dizziness or display any LOB while ambulating. Pt does not require any further acute care OT needs at this time or when she leaves the hospital. Recommend pt be discharged back to home with support of family PRN. Discharge pt from acute care OT services. Current Level of Function (ADLs/self-care): Independent     Functional Outcome Measure: The patient scored 100% on the Barthel Index outcome measure which is indicative of being completely . PLAN :    Recommendation for discharge: (in order for the patient to meet his/her long term goals)  No skilled occupational therapy/ follow up rehabilitation needs identified at this time. This discharge recommendation:  Has not yet been discussed the attending provider and/or case management    IF patient discharges home will need the following DME: none       SUBJECTIVE:   Patient stated Onofre Desai I want to get back to traveling.     OBJECTIVE DATA SUMMARY:   HISTORY:   No past medical history on file. No past surgical history on file. Prior Level of Function/Environment/Context: Pt states that prior to being admitted to the hospital she was independent with ADLs and did not use any assistive devices for ambulation. Pt was driving before as well. Pt likes to travel as her primary hobby.      Expanded or extensive additional review of patient history:   [address] [place] [address] Environment: Private residence  One/Two Story Residence: Two story, live on 1st floor  Living Alone: No  Support Systems: Child(david), Other Family Member(s)  Patient Expects to be Discharged to[de-identified] Home  Current DME Used/Available at Home: None  Tub or Shower Type: Tub/Shower combination    Hand dominance: Right    EXAMINATION OF PERFORMANCE DEFICITS:  Cognitive/Behavioral Status:  Neurologic State: Alert  Orientation Level: Oriented X4  Cognition: Follows commands; Appropriate decision making; Appropriate safety awareness       Hearing: Auditory  Auditory Impairment: None    Vision/Perceptual:      Corrective Lenses: Reading glasses    Range of Motion:  AROM: Within functional limits  PROM: Within functional limits       Strength:  Strength: Generally decreased, functional          Coordination:  Coordination: Generally decreased, functional  Fine Motor Skills-Upper: Left Intact; Right Intact    Gross Motor Skills-Upper: Right Intact; Left Intact    Tone & Sensation:  Tone: Normal            Balance:  Sitting: Intact  Standing: Intact  Standing - Static: Good  Standing - Dynamic : Good    Functional Mobility and Transfers for ADLs:  Bed Mobility:  Scooting: Modified independent    Transfers:  Sit to Stand: Independent   Stand to Sit: Independent   Bed to Chair: Independent   Bathroom Mobility: Independent   Toilet Transfer : Independent     ADL Assessment:  Feeding: Independent    Oral Facial Hygiene/Grooming: Independent     Bathing: Independent    Upper Body Dressing: Independent    Lower Body Dressing: Independent    Toileting: Independent       Functional Measure:    Barthel Index:  Bathin  Bladder: 10  Bowels: 10  Groomin  Dressing: 10  Feeding: 10  Mobility: 15  Stairs: 10  Toilet Use: 10  Transfer (Bed to Chair and Back): 15  Total: 100/100      The Barthel ADL Index: Guidelines  1. The index should be used as a record of what a patient does, not as a record of what a patient could do.   2. The main aim is to establish degree of independence from any help, physical or verbal, however minor and for whatever reason. 3. The need for supervision renders the patient not independent. 4. A patient's performance should be established using the best available evidence. Asking the patient, friends/relatives and nurses are the usual sources, but direct observation and common sense are also important. However direct testing is not needed. 5. Usually the patient's performance over the preceding 24-48 hours is important, but occasionally longer periods will be relevant. 6. Middle categories imply that the patient supplies over 50 per cent of the effort. 7. Use of aids to be independent is allowed. Score Interpretation (from [facility] 83)    Independent   60-79 Minimally independent   40-59 Partially dependent   20-39 Very dependent   <20 Totally dependent     -Romy Palomino., Barthel, DLorrieW. (1965). Functional evaluation: the Barthel Index. [facility] ([address] [facility] [address] (1997). The Barthel activities of daily living index: self-reporting versus actual performance in the old (> or = 75 years). Journal [address] 457), [address] [facility], J..SANJANA., [facility] Cent[facility]rer., [facility]. (1999). Measuring the change in disability after inpatient rehabilitation; comparison of the responsiveness of the Barthel Index and Functional [place]itherbee Measure. Journal of Neurology, Neurosurgery, and Psychiatry, 66(4), 002-350. Cassy Flores, [place].SCOTT, BARRIE Eckert, & Dorinda Mcadams, MLorrieA. (2004) Assessment of post-stroke quality of life in cost-effectiveness studies: The usefulness of the Barthel Index and the EuroQoL-5D.  Quality of Life Research, 15, 229-84        Occupational Therapy Evaluation Charge Determination   History Examination Decision-Making   LOW Complexity : Brief history review  LOW Complexity : 1-3 performance deficits relating to physical, cognitive , or psychosocial skils that result in activity limitations and / or participation restrictions  LOW Complexity : No comorbidities that affect functional and no verbal or physical assistance needed to complete eval tasks       Based on the above components, the patient evaluation is determined to be of the following complexity level: LOW   Pain Rating:  No pain stated on this date    Activity Tolerance:   Good    After treatment patient left in no apparent distress:    Sitting in chair and Call bell within reach    COMMUNICATION/EDUCATION:   The patients plan of care was discussed with: Physical therapist.     Thank you for this referral.  Renae Garner OT  Time Calculation: 28 mins

## 2022-10-19 NOTE — PERIOP NOTES
TRANSFER - OUT REPORT:    Verbal report given to RamirezMOISÉS(name) on Cristina Aviles  being transferred to [facility](unit) for routine post - op       Report consisted of patients Situation, Background, Assessment and   Recommendations(SBAR). Information from the following report(s) SBAR, OR Summary, Procedure Summary, Intake/Output, MAR, Recent Results, and Cardiac Rhythm NSR  was reviewed with the receiving nurse. Opportunity for questions and clarification was provided. Patient transported with:   Monitor  O2 @ 2 liters  Registered Nurse    Handoff Report from Operating Room to PACU    Report received from Shaquille Gilliam RN and AZAM Christy CRNA regarding Cristina Aviles. Surgeon(s):  Santhosh Davalos MD  And Procedure(s) (LRB):  CYSTOSCOPY BILATERAL URETERAL STENT INSERTION (Bilateral)  confirmed   with allergies and drains discussed. Anesthesia type, drugs, patient history, complications, estimated blood loss, vital signs, intake and output, and last pain medication, lines, and temperature were reviewed.

## 2022-10-19 NOTE — PROGRESS NOTES
Patient: Antonio Solorio MRN: [id number]  SSN: [id number]    YOB: 1945  Age: 65 y.o. Sex: female        ADMITTED: 10/18/2022 to Armando Lorenz MD by Baljinder Mirza DO for UTI (urinary tract infection) [N39.0]  Ureteral stone [N20.1]  POD# Day of Surgery Procedure(s):  CYSTOSCOPY BILATERAL URETERAL STENT INSERTION    Antonio Solorio is doing fair this morning . Pt feeling better states pain resolved as well as N/V   Sanchez draining clear yellow urine . Discussed with pt irritative voiding symptoms with stents in place . Explained to pt will need op stone management . VSS afebrile   Cr 1.57  Ucx  pending   Ua  -+ nitrites wbc > 100 bacteria 3+    Vitals: Temp (24hrs), Av.4 °F (36.9 °C), Min:97.7 °F (36.5 °C), Max:100.8 °F (38.2 °C)    Blood pressure 126/62, pulse 63, temperature 98 °F (36.7 °C), resp. rate 18, height 5' 4\" (1.626 m), weight 63 kg (139 lb), SpO2 99 %. Intake and Output:  10/17 1901 - 10/19 0700  In: 800 [I.V.:800]  Out: 200 [Urine:200]  No intake/output data recorded. SHELL Output lats 24 hrs: No data found. SHELL Output last 8 hrs: No data found. BM over last 24 hrs: No data found. Exam:   Gen: NAD  CV: extremities well perfused  Lungs: nonlabored respirations.  Symmetric chest expansion  Ext: no edema  Abdomen: soft appropriately tender   : sanchez      Labs:  CBC:   Lab Results   Component Value Date/Time    WBC 23.0 (H) 10/19/2022 04:48 AM    HCT 39.9 10/19/2022 04:48 AM    PLATELET 147  04:48 AM     BMP:   Lab Results   Component Value Date/Time    Glucose 137 (H) 10/19/2022 04:48 AM    Sodium 143 10/19/2022 04:48 AM    Potassium 3.6 10/19/2022 04:48 AM    Chloride 113 (H) 10/19/2022 04:48 AM    CO2 22 10/19/2022 04:48 AM    BUN 20 10/19/2022 04:48 AM    Creatinine 1.57 (H) 10/19/2022 04:48 AM    Calcium 7.8 (L) 10/19/2022 04:48 AM     Cultures:   Results       Procedure Component Value Units Date/Time    COVID-19 RAPID TEST [[id number]] Collected: 10/19/22 0047    Order Status: Completed Specimen: Nasopharyngeal Updated: 10/19/22 0124     Specimen source Nasopharyngeal        COVID-19 rapid test Not detected        Comment: Rapid Abbott ID Now       Rapid NAAT:  The specimen is NEGATIVE for SARS-CoV-2, the novel coronavirus associated with COVID-19. Negative results should be treated as presumptive and, if inconsistent with clinical signs and symptoms or necessary for patient management, should be tested with an alternative molecular assay. Negative results do not preclude SARS-CoV-2 infection and should not be used as the sole basis for patient management decisions. This test has been authorized by the FDA under an Emergency Use Authorization (EUA) for use by authorized laboratories. Fact sheet for Healthcare Providers:  http://www.dinora.katie/  Fact sheet for Patients: http://www.sergio-asiya.katie/       Methodology: Isothermal Nucleic Acid Amplification         CULTURE, URINE [[id number]] Collected: 10/19/22 0047    Order Status: No result Updated: 10/19/22 0807    CULTURE, BLOOD, PAIRED [[id number]] Collected: 10/18/22 2345    Order Status: Completed Specimen: Blood Updated: 10/19/22 0731     Special Requests: NO SPECIAL REQUESTS        Culture result: NO GROWTH AFTER 6 HOURS            N/A    Imaging:  CT: Results for orders placed during the hospital encounter of 10/18/22    CT ABD PELV WO CONT    Narrative  EXAM: CT ABD PELV WO CONT    INDICATION: right flank pain    COMPARISON: 5/9/2019    IV CONTRAST: None. ORAL CONTRAST: None    TECHNIQUE:  Thin axial images were obtained through the abdomen and pelvis. Coronal and  sagittal reformats were generated. CT dose reduction was achieved through use of  a standardized protocol tailored for this examination and automatic exposure  control for dose modulation.     The absence of intravenous contrast material reduces the sensitivity for  evaluation of the vasculature and solid organs. FINDINGS:  LOWER THORAX: No significant abnormality in the incidentally imaged lower chest.  LIVER: No mass. BILIARY TREE: Gallbladder is within normal limits. CBD is not dilated. SPLEEN: within normal limits. PANCREAS: No focal abnormality. ADRENALS: Unremarkable. KIDNEYS/URETERS: 6 mm stone is seen in the right mid ureter causing moderate  right hydronephrosis. 7 mm stone is seen in the left proximal ureter causing  moderate left hydronephrosis. 4 mm stone is also noted at the left  ureterovesical junction. Bilateral nonobstructing renal stones, the largest of  which measures 8 mm in the right kidney. STOMACH: Unremarkable. SMALL BOWEL: No dilatation or wall thickening. COLON: Sigmoid diverticulosis. APPENDIX: Within normal limits. PERITONEUM: No ascites or pneumoperitoneum. RETROPERITONEUM: No lymphadenopathy or aortic aneurysm. REPRODUCTIVE ORGANS: Unremarkable. URINARY BLADDER: Decompressed. BONES: Degenerative changes are seen in the lumbar spine. ABDOMINAL WALL: No mass or hernia. ADDITIONAL COMMENTS: N/A    Impression  6 mm stone right mid ureter causing moderate right hydronephrosis. 7 mm stone  left proximal ureter causing moderate left hydronephrosis. Additional stone left  UVJ. Bilateral nonobstructing renal stones. Assessment/Plan:     1. Yasmeen ureteral stones - s/p yasmeen ureteral stent placement   -DC Pa advance diet  -Follow urine culture then tailor antibiotics  -Urology to arrange outpatient management    Will sign off for now   - d/w Dr Jewels Barros By: Mayuri Tanner.  Aure Hernandez NP - October 19, 2022

## 2022-10-19 NOTE — ANESTHESIA PREPROCEDURE EVALUATION
Relevant Problems   CARDIOVASCULAR   (+) HTN (hypertension)      RENAL FAILURE   (+) URVASHI (acute kidney injury) ([facility] [address]      ENDOCRINE   (+) Hypothyroidism       Anesthetic History   No history of anesthetic complications            Review of Systems / Medical History  Patient summary reviewed, nursing notes reviewed and pertinent labs reviewed    Pulmonary      Recent URI             Neuro/Psych   Within defined limits           Cardiovascular    Hypertension                   GI/Hepatic/Renal         Renal disease: CRI and stones       Endo/Other      Hypothyroidism       Other Findings   Comments: Last PO 10/18/22 at 4pm         Physical Exam    Airway  Mallampati: III  TM Distance: > 6 cm  Neck ROM: normal range of motion   Mouth opening: Normal     Cardiovascular    Rhythm: regular  Rate: normal         Dental    Dentition: Full upper dentures and Full lower dentures     Pulmonary  Breath sounds clear to auscultation               Abdominal  GI exam deferred       Other Findings            Anesthetic Plan    ASA: 3, emergent  Anesthesia type: general          Induction: RSI  Anesthetic plan and risks discussed with: Patient

## 2022-10-19 NOTE — ACP (ADVANCE CARE PLANNING)
Advance Care Planning Note      NAME: Jaleesa Saab   :  1945   MRN:  [id number]     Date/Time:  10/19/2022 4:42 PM    Active Diagnoses:  Hospital Problems  Date Reviewed: 5/10/2019            Codes Class Noted POA    Ureteral stone ICD-10-CM: N20.1  ICD-9-CM: 592.1  10/19/2022 Unknown        UTI (urinary tract infection) ICD-10-CM: N39.0  ICD-9-CM: 599.0  2019 Unknown           These active diagnoses are of sufficient risk that focused discussion on advance care planning is indicated in order to allow the patient to thoughtfully consider personal goals of care, and if situations arise that prevent the ability to personally give input, to ensure appropriate representation of their personal desires for different levels and aggressiveness of care. Discussion:   Code status addressed and wants to be a Full Code. Patient wants central line and vasopressors if needed. Patient would also want a feeding tube, if needed, for nutritional support. Patient  would like to assign daughter  Madison Carver  as the surrogate decision maker. Persons present and participating in discussion: Priya Soto MD, Daughter. Time Spent:   Total time spent face-to-face in education and discussion:   16   minutes.          Jareth Childs MD   Hospitalist Additional Notes: Patient consent was obtained to proceed with the visit and recommended plan of care after discussion of all risks and benefits, including the risks of COVID-19 exposure. Detail Level: Simple Render Risk Assessment In Note?: yes

## 2022-10-19 NOTE — BRIEF OP NOTE
Brief Postoperative Note    Patient: Clarissa Guzman  YOB: 1945  MRN: [id number]    Date of Procedure: 10/19/2022     Pre-Op Diagnosis: bilateral kidney stones    Post-Op Diagnosis: Same as preoperative diagnosis. and bilateral ureteral stones with infection       Procedure(s):  CYSTOSCOPY BILATERAL URETERAL STENT INSERTION    Surgeon(s):  Minna Garrett MD    Surgical Assistant: None    Anesthesia: General     Estimated Blood Loss (mL): Minimal    Complications: None    Specimens: * No specimens in log *     Implants:   Implant Name Type Inv. Item Serial No.  Lot No. LRB No. Used Action   STENT Cintia  DBL-PGTL [id number] -- [employer]  STENT URET DBL-PGTL [id number] -- POLARIS [id number] [employer]_ [id number] Left 1 Implanted   STENT URET DBL-PGTL [id number] -- POLARIS - [id number]  STENT URET DBL-PGTL [id number] -- POLARIS [id number] [employer]OLOG[employer]_ [id number] Right 1 Implanted       Drains: * No LDAs found *    Findings:   Bilateral ureteral stents placed 6x24. Purulent discharge greater left than right.     Electronically Signed by Tamera Wallis MD on 10/19/2022 at 4:01 AM

## 2022-10-19 NOTE — ANESTHESIA POSTPROCEDURE EVALUATION
Procedure(s):  CYSTOSCOPY BILATERAL URETERAL STENT INSERTION. general    Anesthesia Post Evaluation      Multimodal analgesia: multimodal analgesia used between 6 hours prior to anesthesia start to PACU discharge  Patient location during evaluation: PACU  Patient participation: complete - patient participated  Level of consciousness: sleepy but conscious  Pain management: adequate  Airway patency: patent  Anesthetic complications: no  Cardiovascular status: acceptable, blood pressure returned to baseline and hemodynamically stable  Respiratory status: acceptable and nasal cannula  Hydration status: acceptable  Post anesthesia nausea and vomiting:  none  Final Post Anesthesia Temperature Assessment:  Normothermia (36.0-37.5 degrees C)      INITIAL Post-op Vital signs:   Vitals Value Taken Time   BP 96/43 10/19/22 0500   Temp 36.8 °C (98.2 °F) 10/19/22 0407   Pulse 67 10/19/22 0507   Resp 20 10/19/22 0507   SpO2 97 % 10/19/22 0507   Vitals shown include unvalidated device data.

## 2022-10-19 NOTE — PROGRESS NOTES
End of Shift Note    Bedside shift change report given to Gavino Diego RN (oncoming nurse) by Phan Jack LPN (offgoing nurse). Report included the following information SBAR, Kardex, Procedure Summary, Intake/Output, MAR, and Recent Results    Shift worked:  7p-730a     Shift summary and any significant changes:     Pt settled into unit after surgery. Pt had no c/o pain. Pa draining. Otherwise, pt had an uneventful shift.       Concerns for physician to address:       Zone phone for oncoming shift:   [phone]           Phan Jack LPN

## 2022-10-19 NOTE — ED PROVIDER NOTES
EMERGENCY DEPARTMENT HISTORY AND PHYSICAL EXAM      Date: 10/18/2022  Patient Name: Lesle Lombard    History of Presenting Illness     Chief Complaint   Patient presents with    Chills     Pt arrives in wheelchair to triage for chills and right sided flank pain x2 days. Pt states she recently passed a kidney stone and has burning when she urinates. Pt tachycardic on arrival.     Flank Pain    Vomiting     Multiple episodes of vomiting without blood in vomit       History Provided By: Patient and Patient's Daughter    HPI: Lesle Lombard, 65 y.o. female with PMHx significant for bladder cancer, hyperlipidemia, hypothyroidism, kidney stone, recent treatment for UTI presents to the ED with generally feeling tired and nausea and vomiting for the past 2 days. She is also had chills and felt clammy. Daughter tells me that patient was confused this evening and thought it was morning. She has had moderate crampy intermittent right flank pain and dysuria. Reports that she had kidney stones a few weeks ago and was treated with antibiotics for UTI. Denies any diarrhea. Denies any black or bloody stools. Reports low-grade fever at home to 100. PCP: Ludy Marrufo NP    No current facility-administered medications on file prior to encounter. Current Outpatient Medications on File Prior to Encounter   Medication Sig Dispense Refill    simvastatin (ZOCOR) 40 mg tablet Take 40 mg by mouth nightly.  levothyroxine (SYNTHROID) 100 mcg tablet Take 100 mcg by mouth Daily (before breakfast). Past History     Past Medical History:  No past medical history on file. Past Surgical History:  No past surgical history on file. Family History:  No family history on file. Social History:   Denies alcohol or drug use    Allergies:   Allergies   Allergen Reactions    Sulfa (Sulfonamide Antibiotics) Rash         Review of Systems   Review of Systems   Constitutional:  Positive for appetite change, chills, fatigue and fever. HENT:  Negative for congestion, ear pain, rhinorrhea and sore throat. Respiratory:  Positive for cough. Negative for shortness of breath. Cardiovascular:  Negative for chest pain and palpitations. Gastrointestinal:  Positive for abdominal pain, nausea and vomiting. Negative for diarrhea. Genitourinary:  Positive for dysuria and flank pain. Negative for hematuria. Musculoskeletal:  Negative for back pain, myalgias and neck pain. Skin:  Negative for rash. Neurological:  Negative for dizziness, syncope, light-headedness and headaches. Psychiatric/Behavioral:  Negative for confusion. The patient is nervous/anxious. All other systems reviewed and are negative.       Physical Exam   General appearance -elderly, well appearing, and in no distress  Eyes - pupils equal and reactive, extraocular eye movements intact  ENT - mucous membranes moist, pharynx normal without lesions  Neck - supple, no significant adenopathy; non-tender to palpation  Chest - clear to auscultation, no wheezes, rales or rhonchi; non-tender to palpation  Heart -tachycardic,  regular rhythm, S1 and S2 normal, no murmurs noted  Abdomen - soft, tender to palpation right flank and right lower quadrant, nondistended, no masses or organomegaly  Musculoskeletal - no joint tenderness, deformity or swelling; normal ROM  Extremities - peripheral pulses normal, no pedal edema  Skin -hot to touch, normal coloration and turgor, no rashes  Neurological - alert, oriented x3, normal speech, no focal findings or movement disorder noted    Diagnostic Study Results     Labs -     Recent Results (from the past 72 hour(s))   EKG, 12 LEAD, INITIAL    Collection Time: 10/18/22 11:28 PM   Result Value Ref Range    Ventricular Rate 113 BPM    Atrial Rate 113 BPM    P-R Interval 130 ms    QRS Duration 76 ms    Q-T Interval 352 ms    QTC Calculation (Bezet) 482 ms    Calculated P Axis 20 degrees    Calculated R Axis 85 degrees Calculated T Axis 62 degrees    Diagnosis       Sinus tachycardia  Nonspecific ST abnormality  No previous ECGs available  Confirmed by Kelly Marino ([id number]) on 10/19/2022 12:38:40 PM     CULTURE, BLOOD, PAIRED    Collection Time: 10/18/22 11:45 PM    Specimen: Blood   Result Value Ref Range    Special Requests: NO SPECIAL REQUESTS      Culture result: (A)       PROBABLE ESCHERICHIA COLI GROWING IN 3 OF 4 BOTTLES DRAWN SITES= L A AND R A    Culture result: REMAINING BOTTLE(S) HAS/HAVE NO GROWTH SO FAR      Culture result:       (NOTE) G-R IN 3 BOTTLES CALLED TO MOISÉS GHOSH AT 1710 10/19/22. ART   METABOLIC PANEL, COMPREHENSIVE    Collection Time: 10/18/22 11:51 PM   Result Value Ref Range    Sodium 136 136 - 145 mmol/L    Potassium 4.4 3.5 - 5.1 mmol/L    Chloride 105 97 - 108 mmol/L    CO2 18 (L) 21 - 32 mmol/L    Anion gap 13 5 - 15 mmol/L    Glucose 131 (H) 65 - 100 mg/dL    BUN 21 (H) 6 - 20 MG/DL    Creatinine 1.95 (H) 0.55 - 1.02 MG/DL    BUN/Creatinine ratio 11 (L) 12 - 20      eGFR 26 (L) >60 ml/min/1.73m2    Calcium 9.2 8.5 - 10.1 MG/DL    Bilirubin, total 1.1 (H) 0.2 - 1.0 MG/DL    ALT (SGPT) 23 12 - 78 U/L    AST (SGOT) 37 15 - 37 U/L    Alk.  phosphatase 74 45 - 117 U/L    Protein, total 7.7 6.4 - 8.2 g/dL    Albumin 3.7 3.5 - 5.0 g/dL    Globulin 4.0 2.0 - 4.0 g/dL    A-G Ratio 0.9 (L) 1.1 - 2.2     CBC WITH AUTOMATED DIFF    Collection Time: 10/18/22 11:51 PM   Result Value Ref Range    WBC 14.8 (H) 3.6 - 11.0 K/uL    RBC 5.07 3.80 - 5.20 M/uL    HGB 16.0 11.5 - 16.0 g/dL    HCT 48.3 (H) 35.0 - 47.0 %    MCV 95.3 80.0 - 99.0 FL    MCH 31.6 26.0 - 34.0 PG    MCHC 33.1 30.0 - 36.5 g/dL    RDW 12.8 11.5 - 14.5 %    PLATELET 046 521 - 285 K/uL    MPV 10.9 8.9 - 12.9 FL    NRBC 0.0 0  WBC    ABSOLUTE NRBC 0.00 0.00 - 0.01 K/uL    NEUTROPHILS 95 (H) 32 - 75 %    LYMPHOCYTES 2 (L) 12 - 49 %    MONOCYTES 1 (L) 5 - 13 %    EOSINOPHILS 0 0 - 7 %    BASOPHILS 1 0 - 1 %    IMMATURE GRANULOCYTES 1 (H) 0.0 - 0.5 %    ABS. NEUTROPHILS 14.2 (H) 1.8 - 8.0 K/UL    ABS. LYMPHOCYTES 0.3 (L) 0.8 - 3.5 K/UL    ABS. MONOCYTES 0.1 0.0 - 1.0 K/UL    ABS. EOSINOPHILS 0.0 0.0 - 0.4 K/UL    ABS. BASOPHILS 0.1 0.0 - 0.1 K/UL    ABS. IMM. GRANS. 0.1 (H) 0.00 - 0.04 K/UL    DF SMEAR SCANNED      RBC COMMENTS NORMOCYTIC, NORMOCHROMIC     SAMPLES BEING HELD    Collection Time: 10/18/22 11:51 PM   Result Value Ref Range    SAMPLES BEING HELD PST     COMMENT        Add-on orders for these samples will be processed based on acceptable specimen integrity and analyte stability, which may vary by analyte.    TROPONIN-HIGH SENSITIVITY    Collection Time: 10/18/22 11:51 PM   Result Value Ref Range    Troponin-High Sensitivity 16 0 - 51 ng/L   POC LACTIC ACID    Collection Time: 10/18/22 11:55 PM   Result Value Ref Range    Lactic Acid (POC) 5.17 (HH) 0.40 - 2.00 mmol/L                                                                                                                                                                                                                                                                                          URINALYSIS W/ REFLEX CULTURE    Collection Time: 10/19/22 12:47 AM    Specimen: Urine   Result Value Ref Range    Color DARK YELLOW      Appearance TURBID (A) CLEAR      Specific gravity 1.011      pH (UA) 6.0 5.0 - 8.0      Protein 100 (A) NEG mg/dL    Glucose Negative NEG mg/dL    Ketone Negative NEG mg/dL    Bilirubin Negative NEG      Blood LARGE (A) NEG      Urobilinogen 1.0 0.2 - 1.0 EU/dL    Nitrites Positive (A) NEG      Leukocyte Esterase LARGE (A) NEG      WBC >100 (H) 0 - 4 /hpf    RBC >100 (H) 0 - 5 /hpf    Epithelial cells MODERATE (A) FEW /lpf    Bacteria 3+ (A) NEG /hpf    UA:UC IF INDICATED URINE CULTURE ORDERED (A) CNI     COVID-19 RAPID TEST    Collection Time: 10/19/22 12:47 AM   Result Value Ref Range    Specimen source Nasopharyngeal      COVID-19 rapid test Not detected NOTD INFLUENZA A+B VIRAL AGS    Collection Time: 10/19/22 12:47 AM   Result Value Ref Range    Influenza A Antigen Negative NEG      Influenza B Antigen Negative NEG       Radiologic Studies -               XR CHEST PORT   Final Result      No acute abnormality. CT ABD PELV WO CONT   Final Result   6 mm stone right mid ureter causing moderate right hydronephrosis. 7 mm stone   left proximal ureter causing moderate left hydronephrosis. Additional stone left   UVJ. Bilateral nonobstructing renal stones. CT Results  (Last 48 hours)      None          CXR Results  (Last 48 hours)      None              Medical Decision Making   I am the first provider for this patient. I reviewed the vital signs, available nursing notes, past medical history, past surgical history, family history and social history. Vital Signs-Reviewed the patient's vital signs. Patient Vitals for the past 12 hrs:   Temp Pulse Resp BP SpO2   10/19/22 0009 (!) 100.8 °F (38.2 °C) -- -- -- --   10/19/22 0005 -- 95 (!) 31 -- 97 %   10/18/22 2345 -- 98 22 (!) 94/56 95 %   10/18/22 2316 98.6 °F (37 °C) (!) 120 24 (!) 99/47 95 %       EKG: Sinus tachycardia, 113 bpm, normal axis, normal OK, QRS, QTc intervals, nonspecific ST changes    Records Reviewed: Nursing Notes and Old Medical Records    Provider Notes (Medical Decision Making):   Patient presents to the ED with nausea, vomiting, fever, and generalized fatigue. Differential diagnosis includes pneumonia, UTI, obstructing stone, gastroenteritis, dehydration  Will check CBC, CMP, lactate paired cultures, UA, stone study, chest x-ray. Will treat with IV fluids, Rocephin, Zofran. ED Course:   Initial assessment performed. The patients presenting problems have been discussed, and they are in agreement with the care plan formulated and outlined with them. I have encouraged them to ask questions as they arise throughout their visit.     Progress Notes:  ED Course as of 10/20/22 5351 Wed Oct 19, 2022   0130 Patient noted to have UTI with bilateral obstructing ureteral stones. She meets critieria for severe sepsis/ septic shock with tachycardia, lactate of 5.1, leukocytosis, and hypotension. Treated with 30/kg IV fluid bolus and Rocephin. Urology (Dr. Salomon Naranjo) paged.    Chito Ac Case discussed with nurse practitioner Salomon Naranjo. She will now contact urologist who will also call me back. [AO]   [id number] Case discussed with Dr. Roxana Slaughter (urology). He thinks patient needs to have either bilateral ureteral stents versus nephrostomy tubes depending on whether patient is stable enough for procedure. [AO]   0155 Case discussed with  (hospitalist). He will see and admit patient. States that he is comfortable with patient having bilateral ureteral stents placed. [AO]      ED Course User Index  [AO] Kaye Ovalles MD       Sepsis Re-Assessment Documentation:       The sepsis reassessment was performed at 3:00 AM time on 10/19/890382     Disposition:  Admit to hospitalist    CRITICAL CARE NOTE :        IMPENDING DETERIORATION -Cardiovascular, Metabolic, and Renal    ASSOCIATED RISK FACTORS - Hypotension, Dysrhythmia, Metabolic changes, Dehydration, and Vascular Compromise    MANAGEMENT- Bedside Assessment and Supervision of Care    INTERPRETATION -  Xrays, CT Scan, ECG, and Blood Pressure    INTERVENTIONS - hemodynamic mngmt, Metobolic interventions, and fluid resuscitation    CASE REVIEW - Hospitalist/Intensivist, Nursing, Family, and urology    TREATMENT RESPONSE -Improved    PERFORMED BY - Self        NOTES   :      I have spent 48 minutes of critical care time involved in lab review, consultations with specialist, family decision- making, bedside attention and documentation. During this entire length of time I was immediately available to the patient . Yi Mckay MD              Diagnosis     Clinical Impression:   1. Severe sepsis ([address]    2.  Urinary tract infection with hematuria, site unspecified    3. Hydronephrosis with urinary obstruction due to ureteral calculus    4.  Bilateral ureteral calculi

## 2022-10-19 NOTE — CONSULTS
Urology Consult    Subjective:     Date of Consultation:  October 19, 2022    Referring Physician: ER    Reason for Consultation:  Bilateral stones with infection    History of Present Illness:   Patient is a 65 y.o.  female who is being seen for bilateral ureteral stones and infection. She was admitted to the hospital for No admission diagnoses are documented for this encounter. Mone Bloodgood History of stones and bladder mass. TURBT in the past.  Febrile at home. Altered per the patients daughter. Pain bilateral.  Passed a stone last week. CT reviewed with bilateral stones. No past medical history on file. No past surgical history on file. No family history on file. Social History     Tobacco Use    Smoking status: Not on file    Smokeless tobacco: Not on file   Substance Use Topics    Alcohol use: Not on file     Allergies   Allergen Reactions    Sulfa (Sulfonamide Antibiotics) Rash      Prior to Admission medications    Medication Sig Start Date End Date Taking? Authorizing Provider   simvastatin (ZOCOR) 40 mg tablet Take 40 mg by mouth nightly. Other, MD Mart   levothyroxine (SYNTHROID) 100 mcg tablet Take 100 mcg by mouth Daily (before breakfast). Other, MD Mart         Review of Systems:  A comprehensive review of systems was negative except for that written in the HPI.     Objective:     Patient Vitals for the past 8 hrs:   BP Temp Pulse Resp SpO2 Height Weight   10/19/22 0224 107/88 98.6 °F (37 °C) 90 19 95 % -- --   10/19/22 0200 (!) 107/51 -- 84 26 96 % -- --   10/19/22 0135 (!) 108/53 -- 84 26 96 % -- --   10/19/22 0117 (!) 104/49 -- 80 19 96 % -- --   10/19/22 0055 98/68 98.6 °F (37 °C) 83 28 95 % -- --   10/19/22 0036 (!) 95/50 -- -- -- -- -- --   10/19/22 0009 -- (!) 100.8 °F (38.2 °C) -- -- -- -- --   10/19/22 0005 -- -- 95 (!) 31 97 % -- --   10/18/22 2345 (!) 94/56 -- 98 22 95 % -- --   10/18/22 2316 (!) 99/47 98.6 °F (37 °C) (!) 120 24 95 % 5' 4\" (1.626 m) 63 kg (139 lb) Temp (24hrs), Av.2 °F (37.3 °C), Min:98.6 °F (37 °C), Max:100.8 °F (38.2 °C)      Intake and Output:   No intake/output data recorded. Physical Exam:            General:    alert, cooperative, no distress, appears stated age, toxic                     Skin:  Normal.                HEENT:  Normal        Throat/Neck:  normal and no erythema or exudates noted. Lymph nodes:                   Lungs:  Nml effort      Cardiovascular:  no edema and Tachycardic             Abdomen[de-identified]  soft, non-tender. Bowel sounds normal. No masses,  no organomegaly, normal findings:              Genitalia:  exam deferred          Extremities:  negative       Assessment:     Active Problems:    * No active hospital problems. *        Patient is a 65 y.o.  female who is being seen for bilateral ureteral stones and infection. She was admitted to the hospital for No admission diagnoses are documented for this encounter. Marc Guillen History of stones and bladder mass. TURBT in the past.  Febrile at home. Altered per the patients daughter. Pain bilateral.  Passed a stone last week. CT reviewed with bilateral stones. Plan:     - Emergent bilateral stent placement. - Discussed the need for stents and need for secondary procedure once the infection has cleared. - Discussed with the patient and her daughter.

## 2022-10-20 LAB
ACC. NO. FROM MICRO ORDER, ACCP: ABNORMAL
ACINETOBACTER CALCOACETICUS-BAUMANII COMPLEX, ACBCX: NOT DETECTED
ALBUMIN SERPL-MCNC: 2.8 G/DL (ref 3.5–5)
ALBUMIN/GLOB SERPL: 0.8 {RATIO} (ref 1.1–2.2)
ALP SERPL-CCNC: 51 U/L (ref 45–117)
ALT SERPL-CCNC: 16 U/L (ref 12–78)
ANION GAP SERPL CALC-SCNC: 5 MMOL/L (ref 5–15)
AST SERPL-CCNC: 18 U/L (ref 15–37)
BACTEROIDES FRAGILIS, BFRA: NOT DETECTED
BILIRUB SERPL-MCNC: 0.3 MG/DL (ref 0.2–1)
BIOFIRE COMMENT, BCIDPF: ABNORMAL
BUN SERPL-MCNC: 25 MG/DL (ref 6–20)
BUN/CREAT SERPL: 23 (ref 12–20)
C GLABRATA DNA VAG QL NAA+PROBE: NOT DETECTED
CALCIUM SERPL-MCNC: 8.2 MG/DL (ref 8.5–10.1)
CANDIDA ALBICANS: NOT DETECTED
CANDIDA AURIS, CAAU: NOT DETECTED
CANDIDA KRUSEI, CKRP: NOT DETECTED
CANDIDA PARAPSILOSIS, CPAUP: NOT DETECTED
CANDIDA TROPICALIS, CTROP: NOT DETECTED
CHLORIDE SERPL-SCNC: 113 MMOL/L (ref 97–108)
CO2 SERPL-SCNC: 23 MMOL/L (ref 21–32)
CREAT SERPL-MCNC: 1.09 MG/DL (ref 0.55–1.02)
CRYPTO NEOFORMANS/GATTII, CRYNEG: NOT DETECTED
CTX-M (ESBL RESISTANT GENE), CTX: NOT DETECTED
ENTEROBACTER CLOACAE COMPLEX, ECCP: NOT DETECTED
ENTEROBACTERALES SP. , ENBLS: DETECTED
ENTEROCOCCUS FAECALIS, ENFA: NOT DETECTED
ENTEROCOCCUS FAECIUM, ENFAM: NOT DETECTED
ERYTHROCYTE [DISTWIDTH] IN BLOOD BY AUTOMATED COUNT: 13.1 % (ref 11.5–14.5)
ESCHERICHIA COLI: DETECTED
GLOBULIN SER CALC-MCNC: 3.3 G/DL (ref 2–4)
GLUCOSE SERPL-MCNC: 113 MG/DL (ref 65–100)
HAEMOPHILUS INFLUENZAE, HMI: NOT DETECTED
HCT VFR BLD AUTO: 37.8 % (ref 35–47)
HGB BLD-MCNC: 12.4 G/DL (ref 11.5–16)
IMP (CARBAPENEMASE RESISTANT GENE), IMPC: NOT DETECTED
INR PPP: 1.1 (ref 0.9–1.1)
KLEBSIELLA AEROGENES, KLAE: NOT DETECTED
KLEBSIELLA OXYTOCA: NOT DETECTED
KLEBSIELLA PNEUMONIAE GROUP, KPPG: NOT DETECTED
KPC (CARBAPENEM RESISTANCE GENE): NOT DETECTED
LISTERIA MONOCYTOGENES, LMONP: NOT DETECTED
MCH RBC QN AUTO: 31.9 PG (ref 26–34)
MCHC RBC AUTO-ENTMCNC: 32.8 G/DL (ref 30–36.5)
MCR-1 (COLISTIN RESISTANT GENE), MCR: NOT DETECTED
MCV RBC AUTO: 97.2 FL (ref 80–99)
NDM (CARBAPENEMASE RESISTANT GENE), NDM: NOT DETECTED
NEISSERIA MENINGITIDIS, NMNI: NOT DETECTED
NRBC # BLD: 0 K/UL (ref 0–0.01)
NRBC BLD-RTO: 0 PER 100 WBC
OXA-48-LIKE (CARBAPENEMASE RESISTANT GENE), OXA48: NOT DETECTED
PLATELET # BLD AUTO: 157 K/UL (ref 150–400)
PMV BLD AUTO: 10.9 FL (ref 8.9–12.9)
POTASSIUM SERPL-SCNC: 4.1 MMOL/L (ref 3.5–5.1)
PROT SERPL-MCNC: 6.1 G/DL (ref 6.4–8.2)
PROTEUS, PRP: NOT DETECTED
PROTHROMBIN TIME: 11.8 SEC (ref 9–11.1)
PSEUDOMONAS AERUGINOSA: NOT DETECTED
RBC # BLD AUTO: 3.89 M/UL (ref 3.8–5.2)
RESISTANT GENE SPACE, REGENE: ABNORMAL
SALMONELLA, SALMO: NOT DETECTED
SERRATIA MARCESCENS: NOT DETECTED
SODIUM SERPL-SCNC: 141 MMOL/L (ref 136–145)
STAPH EPIDERMIDIS, STEP: NOT DETECTED
STAPH LUGDUNENSIS, STALUG: NOT DETECTED
STAPHYLOCOCCUS AUREUS: NOT DETECTED
STAPHYLOCOCCUS, STAPP: NOT DETECTED
STENO MALTOPHILIA, STMA: NOT DETECTED
STREPTOCOCCUS , STPSP: NOT DETECTED
STREPTOCOCCUS AGALACTIAE (GROUP B): NOT DETECTED
STREPTOCOCCUS PNEUMONIAE , SPNP: NOT DETECTED
STREPTOCOCCUS PYOGENES (GROUP A), SPYOP: NOT DETECTED
VIM (CARBAPENEMASE RESISTANT GENE), VIM: NOT DETECTED
WBC # BLD AUTO: 24.2 K/UL (ref 3.6–11)

## 2022-10-20 PROCEDURE — 74011250637 HC RX REV CODE- 250/637: Performed by: INTERNAL MEDICINE

## 2022-10-20 PROCEDURE — 74011000258 HC RX REV CODE- 258: Performed by: INTERNAL MEDICINE

## 2022-10-20 PROCEDURE — 97530 THERAPEUTIC ACTIVITIES: CPT

## 2022-10-20 PROCEDURE — 97116 GAIT TRAINING THERAPY: CPT

## 2022-10-20 PROCEDURE — 80053 COMPREHEN METABOLIC PANEL: CPT

## 2022-10-20 PROCEDURE — 74011250636 HC RX REV CODE- 250/636: Performed by: INTERNAL MEDICINE

## 2022-10-20 PROCEDURE — 74011250637 HC RX REV CODE- 250/637: Performed by: NURSE PRACTITIONER

## 2022-10-20 PROCEDURE — 85610 PROTHROMBIN TIME: CPT

## 2022-10-20 PROCEDURE — 85027 COMPLETE CBC AUTOMATED: CPT

## 2022-10-20 PROCEDURE — 36415 COLL VENOUS BLD VENIPUNCTURE: CPT

## 2022-10-20 PROCEDURE — 65270000046 HC RM TELEMETRY

## 2022-10-20 RX ORDER — AMOXICILLIN 250 MG
1 CAPSULE ORAL 2 TIMES DAILY
Status: DISCONTINUED | OUTPATIENT
Start: 2022-10-20 | End: 2022-10-22 | Stop reason: HOSPADM

## 2022-10-20 RX ORDER — FAMOTIDINE 20 MG/1
20 TABLET, FILM COATED ORAL DAILY
Status: DISCONTINUED | OUTPATIENT
Start: 2022-10-21 | End: 2022-10-22 | Stop reason: HOSPADM

## 2022-10-20 RX ORDER — ACETAMINOPHEN 325 MG/1
650 TABLET ORAL
Status: DISCONTINUED | OUTPATIENT
Start: 2022-10-20 | End: 2022-10-22 | Stop reason: HOSPADM

## 2022-10-20 RX ORDER — FACIAL-BODY WIPES
10 EACH TOPICAL DAILY PRN
Status: DISCONTINUED | OUTPATIENT
Start: 2022-10-20 | End: 2022-10-22 | Stop reason: HOSPADM

## 2022-10-20 RX ORDER — POLYETHYLENE GLYCOL 3350 17 G/17G
17 POWDER, FOR SOLUTION ORAL DAILY PRN
Status: DISCONTINUED | OUTPATIENT
Start: 2022-10-20 | End: 2022-10-22 | Stop reason: HOSPADM

## 2022-10-20 RX ADMIN — ACETAMINOPHEN 650 MG: 325 TABLET ORAL at 18:31

## 2022-10-20 RX ADMIN — LEVOTHYROXINE SODIUM 100 MCG: 0.1 TABLET ORAL at 08:26

## 2022-10-20 RX ADMIN — SODIUM CHLORIDE 1 G: 900 INJECTION INTRAVENOUS at 00:13

## 2022-10-20 RX ADMIN — SENNOSIDES AND DOCUSATE SODIUM 1 TABLET: 50; 8.6 TABLET ORAL at 21:43

## 2022-10-20 RX ADMIN — ATORVASTATIN CALCIUM 20 MG: 20 TABLET, FILM COATED ORAL at 21:43

## 2022-10-20 NOTE — PROGRESS NOTES
Famotidine changed per dosing protocol and renal fxn. Est Crcl < 50 ml/min    Changed from 20mg IV q12h to   20mg PO daily.

## 2022-10-20 NOTE — PROGRESS NOTES
End of Shift Note    Bedside shift change report given to Leonidas Maynard RN (oncoming nurse) by Chaim Cortes LPN (offgoing nurse). Report included the following information SBAR, Kardex, Procedure Summary, Intake/Output, MAR, and Recent Results    Shift worked:  7p-730a     Shift summary and any significant changes:     Pt rested in bed through shift. Up ad marsha. Pt had no complaints last night. Otherwise, pt had an uneventful shift. Concerns for physician to address:       Zone phone for oncoming shift:   [phone]       Activity:  Activity Level: Up ad marsha  Number times ambulated in hallways past shift: 0  Number of times OOB to chair past shift: 0    Cardiac:   Cardiac Monitoring: Yes      Cardiac Rhythm: Sinus Audie    Access:   Current line(s): PIV     Genitourinary:   Urinary status: voiding    Respiratory:   O2 Device: None (Room air)  Chronic home O2 use?: NO  Incentive spirometer at bedside: NO     GI:  Last Bowel Movement Date: 10/17/22  Current diet:  ADULT DIET Regular  Passing flatus: YES  Tolerating current diet: YES       Pain Management:   Patient states pain is manageable on current regimen: YES    Skin:  Carlos A Score: 20  Interventions: increase time out of bed    Patient Safety:  Fall Score:  Total Score: 1  Interventions: gripper socks  High Fall Risk: Yes    Length of Stay:  Expected LOS: 1d 21h  Actual LOS: [address], LPN

## 2022-10-20 NOTE — PROGRESS NOTES
End of Shift Note    Bedside shift change report given to Akua Gil LPN (oncoming nurse) by Zach Chopra RN (offgoing nurse). Report included the following information SBAR, Kardex, Intake/Output, and MAR    Shift worked:  7am-7pm     Shift summary and any significant changes:     Pt ambulated to the bathroom with assistance during the shift and tolerated the activity well. Pt sat in the chair for multiple hours during the shift. Pt did not complain of nausea or pain during the shift. Pt is tolerating the current diet. Pt sanchez was removed by RN during the shift and pt is voiding appropriately. Pt complained of slight burning with urination. Pt alternated between NSR and Audie on the cardiac monitor. Concerns for physician to address:       Zone phone for oncoming shift:   [phone]       Activity:  Activity Level: Up with Assistance  Number times ambulated in hallways past shift: 0  Number of times OOB to chair past shift: 2    Cardiac:   Cardiac Monitoring: Yes      Cardiac Rhythm: Sinus Audie    Access:  Current line(s): PIV     Genitourinary:   Urinary status: voiding    Respiratory:   O2 Device: None (Room air)  Chronic home O2 use?: NO  Incentive spirometer at bedside: YES       GI:  Last Bowel Movement Date: 10/17/22  Current diet:  ADULT DIET Regular  Passing flatus: YES  Tolerating current diet: YES       Pain Management:   Patient states pain is manageable on current regimen: YES    Skin:  Carlos A Score: 20  Interventions: float heels and increase time out of bed    Patient Safety:  Fall Score:  Total Score: 2  Interventions: gripper socks and pt to call before getting OOB  High Fall Risk: Yes    Length of Stay:  Expected LOS: 1d 21h  Actual LOS: 0      Zach Chopra RN

## 2022-10-20 NOTE — PROGRESS NOTES
Problem: Mobility Impaired (Adult and Pediatric)  Goal: *Acute Goals and Plan of Care (Insert Text)  Description: FUNCTIONAL STATUS PRIOR TO ADMISSION: Patient was independent and active without use of DME.    HOME SUPPORT PRIOR TO ADMISSION: The patient lived with family but did not require assist.    Physical Therapy Goals  Initiated 10/19/2022  1. Patient will move from supine to sit and sit to supine  in bed with independence within 7 day(s). 2.  Patient will transfer from bed to chair and chair to bed with independence using the least restrictive device within 7 day(s). 3.  Patient will perform sit to stand with independence within 7 day(s). 4.  Patient will ambulate with independence for 120 feet with the least restrictive device within 7 day(s). 5.  Patient will ascend/descend 5 stairs with 1 handrail(s) with modified independence within 7 day(s). Outcome: Resolved/Met   PHYSICAL THERAPY TREATMENT  Patient: Mickey Felix (25 y.o. female)  Date: 10/20/2022  Diagnosis: UTI (urinary tract infection) [N39.0]  Ureteral stone [N20.1] <principal problem not specified>  Procedure(s) (LRB):  CYSTOSCOPY BILATERAL URETERAL STENT INSERTION (Bilateral) 1 Day Post-Op  Precautions:    Chart, physical therapy assessment, plan of care and goals were reviewed. ASSESSMENT  Patient continues with skilled PT services and is progressing towards goals. Pt presents with good stability and balance. Pt received coming out of restroom. Pt agreeable to ambulation. Pt ambulated 400ft Independently. Pt with no instability for LOB. Pt moving well with no safety concerns. Will sign off at this time as pt is up ad marsha and with no acute PT needs. Current Level of Function Impacting Discharge (mobility/balance): Independent     Other factors to consider for discharge: none         PLAN :  Patient continues to benefit from skilled intervention to address the above impairments.   Continue treatment per established plan of care.  to address goals. Recommendation for discharge: (in order for the patient to meet his/her long term goals)  No skilled physical therapy/ follow up rehabilitation needs identified at this time. This discharge recommendation:  Has been made in collaboration with the attending provider and/or case management    IF patient discharges home will need the following DME: none       SUBJECTIVE:   Patient stated I've been up moving around on my own.     OBJECTIVE DATA SUMMARY:   Critical Behavior:  Neurologic State: Alert  Orientation Level: Oriented X4  Cognition: Follows commands, Appropriate decision making, Appropriate safety awareness     Functional Mobility Training:  Bed Mobility:     Supine to Sit: Independent              Transfers:  Sit to Stand: Independent  Stand to Sit: Independent                             Balance:  Sitting: Intact  Standing: Intact  Standing - Static: Good  Standing - Dynamic : Good  Ambulation/Gait Training:  Distance (ft): 400 Feet (ft)  Assistive Device: Gait belt  Ambulation - Level of Assistance: Independent        Gait Abnormalities: Decreased step clearance        Base of Support: Narrowed     Speed/Anne-Marie: Pace decreased (<100 feet/min)  Step Length: Right shortened;Left shortened     Pain Rating:  Pt with no complaints of pain     Activity Tolerance:   WNL and Good    After treatment patient left in no apparent distress:   Supine in bed, Call bell within reach, and Side rails x 3    COMMUNICATION/COLLABORATION:   The patients plan of care was discussed with: Registered nurse.      Collette Dockery PTA   Time Calculation: 23 mins

## 2022-10-20 NOTE — PROGRESS NOTES
Hospitalist Progress Note    NAME: Ame Cortez   :  1945   MRN:  [id number]       Assessment / Plan:  EColi bacteremia   Blood culture positive for E. coli, increase ceftriaxone to 2 g  Repeat blood cultures tomorrow  Await sensitivity  Bilateral hydronephrosis s/p cystoscopy and stent placement  Complicated UTI  Severe sepsis  -Patient underwent cystoscopy with placement.  -Urine culture showed E. coli  -Received bolus IV fluids. Lactic acid trended down  WBC trending up  -Check CBC in AM. Urology ollowing and appreciate recommendations    Janice   Creat trending down  Hypothyroidism  Dyslipidemia  -Continue levothyroxine and statin        Code Status: Full code  Surrogate Decision Maker: Daughter     DVT Prophylaxis: SCDs  GI Prophylaxis: not indicated     Baseline: From home, independent of ADLs      18.5 - 24.9 Normal weight / Body mass index is 23.86 kg/m². Estimated discharge date:   Barriers:    Code status: Full  Prophylaxis: SCD's  Recommended Disposition: Home w/Family     Subjective:     Chief Complaint / Reason for Physician Visit  Follow-up E. coli bacteremia discussed with RN events overnight. Is better, no acute issues    Review of Systems:  Symptom Y/N Comments  Symptom Y/N Comments   Fever/Chills n   Chest Pain n    Poor Appetite    Edema     Cough    Abdominal Pain n    Sputum    Joint Pain     SOB/NICHOLSON n   Pruritis/Rash     Nausea/vomit    Tolerating PT/OT     Diarrhea    Tolerating Diet y    Constipation    Other       Could NOT obtain due to:      Objective:     VITALS:   Last 24hrs VS reviewed since prior progress note.  Most recent are:  Patient Vitals for the past 24 hrs:   Temp Pulse Resp BP SpO2   10/20/22 0800 97.9 °F (36.6 °C) 71 16 (!) 154/63 95 %   10/20/22 0309 99.4 °F (37.4 °C) 69 17 117/65 94 %   10/19/22 2317 97.8 °F (36.6 °C) 73 18 (!) 153/57 96 %   10/19/22 2012 98.1 °F (36.7 °C) 73 17 (!) 135/50 98 %   10/19/22 1551 -- (!) 59 -- -- --   10/19/22 1510 98.3 °F (36.8 °C) 75 18 (!) 135/59 97 %       Intake/Output Summary (Last 24 hours) at 10/20/2022 1508  Last data filed at 10/20/2022 0232  Gross per 24 hour   Intake 690 ml   Output 150 ml   Net 540 ml        I had a face to face encounter and independently examined this patient on 10/20/2022, as outlined below:  PHYSICAL EXAM:  General: WD, WN. Alert, cooperative, no acute distress    EENT:  EOMI. Anicteric sclerae. MMM  Resp:  CTA bilaterally, no wheezing or rales. No accessory muscle use  CV:  Regular  rhythm,  No edema  GI:  Soft, Non distended, Non tender. +Bowel sounds  Neurologic:  Alert and oriented X 3, normal speech,   Psych:   Good insight. Not anxious nor agitated  Skin:  No rashes. No jaundice    Reviewed most current lab test results and cultures  YES  Reviewed most current radiology test results   YES  Review and summation of old records today    NO  Reviewed patient's current orders and MAR    YES  PMH/ reviewed - no change compared to H&P  ________________________________________________________________________  Care Plan discussed with:    Comments   Patient x    Family      RN x    Care Manager     Consultant                        Multidiciplinary team rounds were held today with , nursing, pharmacist and clinical coordinator. Patient's plan of care was discussed; medications were reviewed and discharge planning was addressed. ________________________________________________________________________  Total NON critical care TIME:  35   Minutes    Total CRITICAL CARE TIME Spent:   Minutes non procedure based      Comments   >50% of visit spent in counseling and coordination of care     ________________________________________________________________________  Nini Delgado MD     Procedures: see electronic medical records for all procedures/Xrays and details which were not copied into this note but were reviewed prior to creation of Plan.       LABS:  I reviewed today's most current labs and imaging studies.   Pertinent labs include:  Recent Labs     10/20/22  0026 10/19/22  0448 10/18/22  2351   WBC 24.2* 23.0* 14.8*   HGB 12.4 13.4 16.0   HCT 37.8 39.9 48.3*    157 198     Recent Labs     10/20/22  0026 10/19/22  0448 10/18/22  2351    143 136   K 4.1 3.6 4.4   * 113* 105   CO2 23 22 18*   * 137* 131*   BUN 25* 20 21*   CREA 1.09* 1.57* 1.95*   CA 8.2* 7.8* 9.2   ALB 2.8* 2.8* 3.7   TBILI 0.3 0.4 1.1*   ALT 16 14 23   INR 1.1 1.2*  --        Signed: Aiyana Way MD

## 2022-10-20 NOTE — PROGRESS NOTES
End of Shift Note    Bedside shift change report given to [facility] (oncoming nurse) by Kenton Barrett RN (offgoing nurse).   Report included the following information SBAR, Kardex, Intake/Output, MAR, and Recent Results    Shift worked:  7a-7p     Shift summary and any significant changes:     Pt ambulating well in halls and room, tele discontinued, uneventful shift     Concerns for physician to address:  none     Zone phone for [facility] shift:   [phone]

## 2022-10-21 LAB
ANION GAP SERPL CALC-SCNC: 6 MMOL/L (ref 5–15)
BASOPHILS # BLD: 0.1 K/UL (ref 0–0.1)
BASOPHILS NFR BLD: 1 % (ref 0–1)
BUN SERPL-MCNC: 21 MG/DL (ref 6–20)
BUN/CREAT SERPL: 23 (ref 12–20)
CALCIUM SERPL-MCNC: 8.5 MG/DL (ref 8.5–10.1)
CHLORIDE SERPL-SCNC: 112 MMOL/L (ref 97–108)
CO2 SERPL-SCNC: 23 MMOL/L (ref 21–32)
CREAT SERPL-MCNC: 0.93 MG/DL (ref 0.55–1.02)
DIFFERENTIAL METHOD BLD: ABNORMAL
EOSINOPHIL # BLD: 0.1 K/UL (ref 0–0.4)
EOSINOPHIL NFR BLD: 1 % (ref 0–7)
ERYTHROCYTE [DISTWIDTH] IN BLOOD BY AUTOMATED COUNT: 12.9 % (ref 11.5–14.5)
GLUCOSE SERPL-MCNC: 90 MG/DL (ref 65–100)
HCT VFR BLD AUTO: 37.4 % (ref 35–47)
HGB BLD-MCNC: 12.5 G/DL (ref 11.5–16)
IMM GRANULOCYTES # BLD AUTO: 0.1 K/UL (ref 0–0.04)
IMM GRANULOCYTES NFR BLD AUTO: 1 % (ref 0–0.5)
LYMPHOCYTES # BLD: 1.9 K/UL (ref 0.8–3.5)
LYMPHOCYTES NFR BLD: 15 % (ref 12–49)
MCH RBC QN AUTO: 32.1 PG (ref 26–34)
MCHC RBC AUTO-ENTMCNC: 33.4 G/DL (ref 30–36.5)
MCV RBC AUTO: 95.9 FL (ref 80–99)
MONOCYTES # BLD: 1 K/UL (ref 0–1)
MONOCYTES NFR BLD: 8 % (ref 5–13)
NEUTS SEG # BLD: 9.2 K/UL (ref 1.8–8)
NEUTS SEG NFR BLD: 74 % (ref 32–75)
NRBC # BLD: 0 K/UL (ref 0–0.01)
NRBC BLD-RTO: 0 PER 100 WBC
PLATELET # BLD AUTO: 161 K/UL (ref 150–400)
PMV BLD AUTO: 10.8 FL (ref 8.9–12.9)
POTASSIUM SERPL-SCNC: 4.2 MMOL/L (ref 3.5–5.1)
RBC # BLD AUTO: 3.9 M/UL (ref 3.8–5.2)
SODIUM SERPL-SCNC: 141 MMOL/L (ref 136–145)
WBC # BLD AUTO: 12.3 K/UL (ref 3.6–11)

## 2022-10-21 PROCEDURE — 74011250636 HC RX REV CODE- 250/636: Performed by: INTERNAL MEDICINE

## 2022-10-21 PROCEDURE — 74011000258 HC RX REV CODE- 258: Performed by: INTERNAL MEDICINE

## 2022-10-21 PROCEDURE — 74011250637 HC RX REV CODE- 250/637: Performed by: INTERNAL MEDICINE

## 2022-10-21 PROCEDURE — 36415 COLL VENOUS BLD VENIPUNCTURE: CPT

## 2022-10-21 PROCEDURE — 74011250637 HC RX REV CODE- 250/637: Performed by: NURSE PRACTITIONER

## 2022-10-21 PROCEDURE — 94760 N-INVAS EAR/PLS OXIMETRY 1: CPT

## 2022-10-21 PROCEDURE — 87040 BLOOD CULTURE FOR BACTERIA: CPT

## 2022-10-21 PROCEDURE — 65270000046 HC RM TELEMETRY

## 2022-10-21 PROCEDURE — 80048 BASIC METABOLIC PNL TOTAL CA: CPT

## 2022-10-21 PROCEDURE — 85025 COMPLETE CBC W/AUTO DIFF WBC: CPT

## 2022-10-21 RX ORDER — IPRATROPIUM BROMIDE AND ALBUTEROL SULFATE 2.5; .5 MG/3ML; MG/3ML
3 SOLUTION RESPIRATORY (INHALATION)
Status: DISCONTINUED | OUTPATIENT
Start: 2022-10-21 | End: 2022-10-22 | Stop reason: HOSPADM

## 2022-10-21 RX ORDER — IPRATROPIUM BROMIDE AND ALBUTEROL SULFATE 2.5; .5 MG/3ML; MG/3ML
3 SOLUTION RESPIRATORY (INHALATION)
Status: DISCONTINUED | OUTPATIENT
Start: 2022-10-21 | End: 2022-10-21

## 2022-10-21 RX ORDER — GUAIFENESIN 100 MG/5ML
200 SOLUTION ORAL
Status: DISCONTINUED | OUTPATIENT
Start: 2022-10-21 | End: 2022-10-22 | Stop reason: HOSPADM

## 2022-10-21 RX ORDER — GUAIFENESIN 100 MG/5ML
100 SOLUTION ORAL
Status: DISCONTINUED | OUTPATIENT
Start: 2022-10-21 | End: 2022-10-21

## 2022-10-21 RX ADMIN — FAMOTIDINE 20 MG: 20 TABLET, FILM COATED ORAL at 08:51

## 2022-10-21 RX ADMIN — ATORVASTATIN CALCIUM 20 MG: 20 TABLET, FILM COATED ORAL at 23:09

## 2022-10-21 RX ADMIN — LEVOTHYROXINE SODIUM 100 MCG: 0.1 TABLET ORAL at 07:46

## 2022-10-21 RX ADMIN — CEFTRIAXONE SODIUM 2 G: 2 INJECTION, POWDER, FOR SOLUTION INTRAMUSCULAR; INTRAVENOUS at 23:10

## 2022-10-21 RX ADMIN — CEFTRIAXONE SODIUM 2 G: 2 INJECTION, POWDER, FOR SOLUTION INTRAMUSCULAR; INTRAVENOUS at 01:45

## 2022-10-21 RX ADMIN — SENNOSIDES AND DOCUSATE SODIUM 1 TABLET: 50; 8.6 TABLET ORAL at 08:51

## 2022-10-21 RX ADMIN — SENNOSIDES AND DOCUSATE SODIUM 1 TABLET: 50; 8.6 TABLET ORAL at 17:19

## 2022-10-21 RX ADMIN — GUAIFENESIN 200 MG: 200 SOLUTION ORAL at 02:43

## 2022-10-21 NOTE — PROGRESS NOTES
Received notification from bedside RN about patient with regards to: requesting medication for constipation  VS: /62, HR 66, RR 17, O2 sat 96% on RA    Intervention given: Suzan-colace PO BID, Miralax PO PRN, Dulcolax supp PRN rdered

## 2022-10-21 NOTE — PROGRESS NOTES
Problem: Falls - Risk of  Goal: *Absence of Falls  Description: Document Mae Don Fall Risk and appropriate interventions in the flowsheet.   Outcome: Progressing Towards Goal  Note: Fall Risk Interventions:  Mobility Interventions: Communicate number of staff needed for ambulation/transfer         Medication Interventions: Teach patient to arise slowly    Elimination Interventions: Call light in reach

## 2022-10-21 NOTE — PROGRESS NOTES
Problem: Falls - Risk of  Goal: *Absence of Falls  Description: Document Vernia Colder Fall Risk and appropriate interventions in the flowsheet.   Outcome: Progressing Towards Goal  Note: Fall Risk Interventions:  Mobility Interventions: Communicate number of staff needed for ambulation/transfer         Medication Interventions: Teach patient to arise slowly    Elimination Interventions: Call light in reach

## 2022-10-21 NOTE — PROGRESS NOTES
Hospitalist Progress Note    NAME: Tresa Schultz   :  1945   MRN:  [id number]       Assessment / Plan:  EColi bacteremia   Blood culture positive for E. coli, increase ceftriaxone to 2 g  Repeat blood culture negative to date. Await sensitivity  Will discharge on p.o. antibiotic tomorrow after getting 72 hours of IV antibiotics  Bilateral hydronephrosis s/p cystoscopy and stent placement  Complicated UTI  Severe sepsis  -Patient underwent cystoscopy with stent placement.  -Urine culture showed E. coli  -Received bolus IV fluids. Lactic acid trended down  WBC trending up, now trending down  -Check CBC in AM. Urology ollowing and appreciate recommendations    Janice   Creat trending down  Hypothyroidism  Dyslipidemia  -Continue levothyroxine and statin        Code Status: Full code  Surrogate Decision Maker: Daughter     DVT Prophylaxis: SCDs  GI Prophylaxis: not indicated     Baseline: From home, independent of ADLs      18.5 - 24.9 Normal weight / Body mass index is 23.86 kg/m². Estimated discharge date:   Barriers:    Code status: Full  Prophylaxis: SCD's  Recommended Disposition: Home w/Family     Subjective:     Chief Complaint / Reason for Physician Visit  Follow-up E. coli bacteremia discussed with RN events overnight. Complain of constipation    Review of Systems:  Symptom Y/N Comments  Symptom Y/N Comments   Fever/Chills n   Chest Pain n    Poor Appetite    Edema     Cough    Abdominal Pain n    Sputum    Joint Pain     SOB/NICHOLSON n   Pruritis/Rash     Nausea/vomit    Tolerating PT/OT     Diarrhea    Tolerating Diet y    Constipation    Other       Could NOT obtain due to:      Objective:     VITALS:   Last 24hrs VS reviewed since prior progress note.  Most recent are:  Patient Vitals for the past 24 hrs:   Temp Pulse Resp BP SpO2   10/21/22 0351 98 °F (36.7 °C) 62 18 (!) 149/59 94 %   10/20/22 1954 98.4 °F (36.9 °C) 66 17 139/62 96 %   10/20/22 1656 97.7 °F (36.5 °C) 84 16 112/70 98 %         Intake/Output Summary (Last 24 hours) at 10/21/2022 0843  Last data filed at 10/21/2022 0427  Gross per 24 hour   Intake 50 ml   Output --   Net 50 ml          I had a face to face encounter and independently examined this patient on 10/21/2022, as outlined below:  PHYSICAL EXAM:  General: WD, WN. Alert, cooperative, no acute distress    EENT:  EOMI. Anicteric sclerae. MMM  Resp:  CTA bilaterally, no wheezing or rales. No accessory muscle use  CV:  Regular  rhythm,  No edema  GI:  Soft, Non distended, Non tender. +Bowel sounds  Neurologic:  Alert and oriented X 3, normal speech,   Psych:   Good insight. Not anxious nor agitated  Skin:  No rashes. No jaundice    Reviewed most current lab test results and cultures  YES  Reviewed most current radiology test results   YES  Review and summation of old records today    NO  Reviewed patient's current orders and MAR    YES  PMH/SH reviewed - no change compared to H&P  ________________________________________________________________________  Care Plan discussed with:    Comments   Patient x    Family      RN x    Care Manager     Consultant                        Multidiciplinary team rounds were held today with , nursing, pharmacist and clinical coordinator. Patient's plan of care was discussed; medications were reviewed and discharge planning was addressed. ________________________________________________________________________  Total NON critical care TIME:  35   Minutes    Total CRITICAL CARE TIME Spent:   Minutes non procedure based      Comments   >50% of visit spent in counseling and coordination of care     ________________________________________________________________________  Elnoria Adolfo, MD     Procedures: see electronic medical records for all procedures/Xrays and details which were not copied into this note but were reviewed prior to creation of Plan.       LABS:  I reviewed today's most current labs and imaging studies.   Pertinent labs include:  Recent Labs     10/21/22  0116 10/20/22  0026 10/19/22  0448   WBC 12.3* 24.2* 23.0*   HGB 12.5 12.4 13.4   HCT 37.4 37.8 39.9    157 157       Recent Labs     10/21/22  0116 10/20/22  0026 10/19/22  0448 10/18/22  2351    141 143 136   K 4.2 4.1 3.6 4.4   * 113* 113* 105   CO2 23 23 22 18*   GLU 90 113* 137* 131*   BUN 21* 25* 20 21*   CREA 0.93 1.09* 1.57* 1.95*   CA 8.5 8.2* 7.8* 9.2   ALB  --  2.8* 2.8* 3.7   TBILI  --  0.3 0.4 1.1*   ALT  --  16 14 23   INR  --  1.1 1.2*  --          Signed: Leander Ramirez MD

## 2022-10-21 NOTE — PROGRESS NOTES
End of Shift Note    Bedside shift change report given to Neville Pate (oncoming nurse) by Valarie Muñoz RN (offgoing nurse). Report included the following information SBAR, Kardex, Intake/Output, MAR, and Recent Results    Shift worked:  7a-7p     Shift summary and any significant changes:    Pt independent, up ad marsha in the halls and her room. No issues to report. Concerns for physician to address: Discharge?      Zone phone for oncoming shift:   [phone]

## 2022-10-21 NOTE — PROGRESS NOTES
End of Shift Note    Bedside shift change report given to Eden Colón  (oncoming nurse) by Hayden Shepherd LPN (offgoing nurse). Report included the following information SBAR, Intake/Output, MAR, Accordion, Recent Results, and Med Rec Status    Shift worked:  7p-7a     Shift summary and any significant changes:    No complaints of pain. Pt stated she feels constipated and request medication, MD notified. Pt ambulated to bathroom x2, pt stated urine is pink. Stridor auscultated, MD notified awaiting RT. Waiting blood cultures       Concerns for physician to address:  none     Zone phone for oncoming shift:  [phone]       Activity:  Activity Level: Up ad marsha  Number times ambulated in hallways past shift: 0  Number of times OOB to chair past shift: 0    Cardiac:   Cardiac Monitoring: No      Cardiac Rhythm: Sinus Audie    Access:  Current line(s): PIV     Genitourinary:   Urinary status: voiding    Respiratory:   O2 Device: None (Room air)  Chronic home O2 use?: NO  Incentive spirometer at bedside: YES       GI:  Last Bowel Movement Date: 10/19/22  Current diet:  ADULT DIET Regular  Passing flatus: no  Tolerating current diet: YES       Pain Management:   Patient states pain is manageable on current regimen: N/A    Skin:  Carlos A Score: 20  Interventions: increase time out of bed    Patient Safety:  Fall Score:  Total Score: 1  Interventions: gripper socks and pt to call before getting OOB  High Fall Risk: Yes    Length of Stay:  Expected LOS: 1d 21h  Actual LOS: [address] [facility]

## 2022-10-21 NOTE — PROGRESS NOTES
Physical Therapy    Chart reviewed and discussed with RN who reports patient is ambulating indep in the hallways. Will sign off for PT with no further needs.     Alicia Torres

## 2022-10-22 VITALS
WEIGHT: 139 LBS | SYSTOLIC BLOOD PRESSURE: 130 MMHG | TEMPERATURE: 98.2 F | BODY MASS INDEX: 23.73 KG/M2 | HEIGHT: 64 IN | HEART RATE: 69 BPM | RESPIRATION RATE: 16 BRPM | DIASTOLIC BLOOD PRESSURE: 72 MMHG | OXYGEN SATURATION: 94 %

## 2022-10-22 LAB
BACTERIA SPEC CULT: ABNORMAL
BASOPHILS # BLD: 0.1 K/UL (ref 0–0.1)
BASOPHILS NFR BLD: 1 % (ref 0–1)
CC UR VC: ABNORMAL
DIFFERENTIAL METHOD BLD: ABNORMAL
EOSINOPHIL # BLD: 0.1 K/UL (ref 0–0.4)
EOSINOPHIL NFR BLD: 2 % (ref 0–7)
ERYTHROCYTE [DISTWIDTH] IN BLOOD BY AUTOMATED COUNT: 12.5 % (ref 11.5–14.5)
HCT VFR BLD AUTO: 40.4 % (ref 35–47)
HGB BLD-MCNC: 13.4 G/DL (ref 11.5–16)
IMM GRANULOCYTES # BLD AUTO: 0.1 K/UL (ref 0–0.04)
IMM GRANULOCYTES NFR BLD AUTO: 1 % (ref 0–0.5)
LYMPHOCYTES # BLD: 1.8 K/UL (ref 0.8–3.5)
LYMPHOCYTES NFR BLD: 19 % (ref 12–49)
MCH RBC QN AUTO: 31.3 PG (ref 26–34)
MCHC RBC AUTO-ENTMCNC: 33.2 G/DL (ref 30–36.5)
MCV RBC AUTO: 94.4 FL (ref 80–99)
MONOCYTES # BLD: 0.7 K/UL (ref 0–1)
MONOCYTES NFR BLD: 8 % (ref 5–13)
NEUTS SEG # BLD: 6.8 K/UL (ref 1.8–8)
NEUTS SEG NFR BLD: 69 % (ref 32–75)
NRBC # BLD: 0 K/UL (ref 0–0.01)
NRBC BLD-RTO: 0 PER 100 WBC
PLATELET # BLD AUTO: 206 K/UL (ref 150–400)
PMV BLD AUTO: 10.5 FL (ref 8.9–12.9)
RBC # BLD AUTO: 4.28 M/UL (ref 3.8–5.2)
SERVICE CMNT-IMP: ABNORMAL
WBC # BLD AUTO: 9.5 K/UL (ref 3.6–11)

## 2022-10-22 PROCEDURE — 74011250637 HC RX REV CODE- 250/637: Performed by: INTERNAL MEDICINE

## 2022-10-22 PROCEDURE — 74011250637 HC RX REV CODE- 250/637: Performed by: NURSE PRACTITIONER

## 2022-10-22 PROCEDURE — 85025 COMPLETE CBC W/AUTO DIFF WBC: CPT

## 2022-10-22 PROCEDURE — 36415 COLL VENOUS BLD VENIPUNCTURE: CPT

## 2022-10-22 RX ORDER — CIPROFLOXACIN 500 MG/1
500 TABLET ORAL 2 TIMES DAILY
Qty: 14 TABLET | Refills: 0 | Status: SHIPPED | OUTPATIENT
Start: 2022-10-22 | End: 2022-10-29

## 2022-10-22 RX ADMIN — LEVOTHYROXINE SODIUM 100 MCG: 0.1 TABLET ORAL at 07:02

## 2022-10-22 RX ADMIN — SENNOSIDES AND DOCUSATE SODIUM 1 TABLET: 50; 8.6 TABLET ORAL at 09:06

## 2022-10-22 RX ADMIN — FAMOTIDINE 20 MG: 20 TABLET, FILM COATED ORAL at 09:06

## 2022-10-22 NOTE — PROGRESS NOTES
Problem: Falls - Risk of  Goal: *Absence of Falls  Description: Document Melissa Monge Fall Risk and appropriate interventions in the flowsheet.   Outcome: Progressing Towards Goal  Note: Fall Risk Interventions:  Mobility Interventions: Communicate number of staff needed for ambulation/transfer         Medication Interventions: Teach patient to arise slowly    Elimination Interventions: Call light in reach

## 2022-10-22 NOTE — PROGRESS NOTES
Respiratory called to see pt for wheeze; pt having congested coughing; exp wheeze on riight; RRT prepared to aerosol tx when pt started coughing again; with creme sputum; RRT listened again; lungs then clear; pt has good aeration; RRT explained to pt the medication and how it works; pt has not had an aerosol tx (Duo) before. Notified RN pt lungs now clear. Aerosol tx not needed at this time.

## 2022-10-22 NOTE — PROGRESS NOTES
End of Shift Note    Bedside shift change report given to [id number]  (oncoming nurse) by Daryl Chambers LPN (offgoing nurse). Report included the following information SBAR, Intake/Output, MAR, Accordion, Recent Results, and Med Rec Status    Shift worked:  7p-7a     Shift summary and any significant changes:     No complaints of pain. Pt ambulated to bathroom x4. Voiding light pink urine. Expiratory wheezing noted, RT called ( read note ). Concerns for physician to address:  none     Zone phone for oncoming shift:   [phone]       Activity:  Activity Level: Up ad marsha  Number times ambulated in hallways past shift: 0  Number of times OOB to chair past shift: 0    Cardiac:   Cardiac Monitoring: No      Cardiac Rhythm: Sinus Audie    Access:  Current line(s): PIV     Genitourinary:   Urinary status: voiding    Respiratory:   O2 Device: None (Room air)  Chronic home O2 use?: NO  Incentive spirometer at bedside: YES       GI:  Last Bowel Movement Date: 10/19/22  Current diet:  ADULT DIET Regular  Passing flatus: NO  Tolerating current diet: YES       Pain Management:   Patient states pain is manageable on current regimen: NO    Skin:  Carlos A Score: 22  Interventions: float heels and increase time out of bed    Patient Safety:  Fall Score:  Total Score: 0  Interventions: gripper socks and pt to call before getting OOB  High Fall Risk: Yes    Length of Stay:  Expected LOS: 1d 21h  Actual LOS: 1 Junior Tellez LPN

## 2022-10-22 NOTE — PROGRESS NOTES
Pt is cleared for d/c from a CM standpoint. CM acknowledged d/c order. CM with met with pt at bedside to discuss. CM offered pt HH per therapy's recommendation. Pt declined stating she does not need it. Pt's daughter is coming to transport her home. CM reviewed 2IM notice with pt and placed signed copy on chart. CM unable to make follow up appts due to wkend d/c. Care Management Interventions  PCP Verified by CM: Yes Derrick Garcia NP)  Palliative Care Criteria Met (RRAT>21 & CHF Dx)?: No (No MD order for Palliative Care)  Mode of Transport at Discharge: Other (see comment) (dtr)  Transition of Care Consult (CM Consult):  Other (home with dtr)  Discharge Durable Medical Equipment: No  Physical Therapy Consult: Yes  Occupational Therapy Consult: Yes  Speech Therapy Consult: No  Support Systems: Child(david)  Confirm Follow Up Transport: Family  The Patient and/or Patient Representative was Provided with a Choice of Provider and Agrees with the Discharge Plan?: Yes  Freedom of Choice List was Provided with Basic Dialogue that Supports the Patient's Individualized Plan of Care/Goals, Treatment Preferences and Shares the Quality Data Associated with the Providers?: Yes  [place] Resource Information Provided?: No  Discharge Location  Patient Expects to be Discharged to[de-identified] Home with family assistance      THERESA Tan  Care Management, [place] [address]

## 2022-10-22 NOTE — PROGRESS NOTES
Problem: Patient Education: Go to Patient Education Activity  Goal: Patient/Family Education  Outcome: Resolved/Met     Problem: Falls - Risk of  Goal: *Absence of Falls  Description: Document Beau Subramanian Fall Risk and appropriate interventions in the flowsheet.   Outcome: Resolved/Met     Problem: Patient Education: Go to Patient Education Activity  Goal: Patient/Family Education  Outcome: Resolved/Met

## 2022-10-22 NOTE — DISCHARGE SUMMARY
Hospitalist Discharge Summary     Patient ID:  Kenrick Ivan  [id number]  89 y.o.  1945  10/18/2022    PCP on record: Joann Campos NP    Admit date: 10/18/2022  Discharge date and time: 10/22/2022    DISCHARGE DIAGNOSIS:  EColi bacteremia   Bilateral hydronephrosis s/p cystoscopy and stent placement  Complicated UTI  Severe sepsis  -Janice   Creat trending down  Hypothyroidism  Dyslipidemia    CONSULTATIONS:  IP CONSULT TO UROLOGY  IP CONSULT TO HOSPITALIST    Excerpted HPI from H&P of  [address]: Chills, right flank pain     HISTORY OF PRESENT ILLNESS:     Kenrick Ivan is a 65 y.o.   female who presents with past medical history of hypothyroidism, dyslipidemia is coming the hospital with complaints of right flank pain, nausea vomiting and also fever. Patient was being ultrafiltered about 2 days ago when she started having the symptoms. She reports right flank pain is about 5 x 10, increases with movement and without any relieving factors. She reports nausea along with vomiting and is not able to keep anything down. On arrival to ED, she was noted to have leukocytosis, lactic acid was elevated. She had a CT abdomen which showed evidence of a 6 mm right mid ureter with hydronephrosis and also left-sided hydronephrosis for which urology was consulted and patient underwent a bilateral stent placement. We were asked to admit for work up and evaluation of the above problems. ______________________________________________________________________  DISCHARGE SUMMARY/HOSPITAL COURSE:  for full details see H&P, daily progress notes, labs, consult notes. EColi bacteremia   Blood culture positive for E. coli, status post IV ceftriaxone for 72 hours, switch to p.o. Cipro  Repeat blood culture negative to date.     Bilateral hydronephrosis s/p cystoscopy and stent placement  Complicated UTI  Severe sepsis  -Patient underwent cystoscopy with stent placement.  -Urine culture showed E. coli  -Received bolus IV fluids. Lactic acid trended down  WBC trending up, now trending down  -. Urology ollowing and appreciate recommendations     Janice   Creat trending down  Hypothyroidism  Dyslipidemia  -Continue levothyroxine and statin                _______________________________________________________________________  Patient seen and examined by me on discharge day. Pertinent Findings:  Gen:    Not in distress  Chest: Clear lungs  CVS:   Regular rhythm. No edema  Abd:  Soft, not distended, not tender  Neuro:  Alert, oriented x3  _______________________________________________________________________  DISCHARGE MEDICATIONS:   Current Discharge Medication List        START taking these medications    Details   ciprofloxacin HCl (Cipro) 500 mg tablet Take 1 Tablet by mouth two (2) times a day for 7 days. Qty: 14 Tablet, Refills: 0  Start date: 10/22/2022, End date: 10/29/2022           CONTINUE these medications which have NOT CHANGED    Details   simvastatin (ZOCOR) 40 mg tablet Take 40 mg by mouth nightly. levothyroxine (SYNTHROID) 100 mcg tablet Take 100 mcg by mouth Daily (before breakfast). Patient Follow Up Instructions:    Activity: Activity as tolerated  Diet: Regular Diet  Wound Care: None needed      Follow-up Information       Follow up With Specialties Details [facility]  Schedule an appointment as soon as possible for a visit in 2 week(s)  [address] [id number]          ________________________________________________________________    Risk of deterioration: low     Condition at Discharge:  Stable  __________________________________________________________________    Disposition  Home with family, no needs    ____________________________________________________________________    Code Status: Full Code  ___________________________________________________________________      Total time in minutes spent coordinating this discharge (includes going over instructions, follow-up, prescriptions, and preparing report for sign off to her PCP) :  >30 minutes    Signed:  Letty Portillo MD

## 2022-10-22 NOTE — DISCHARGE INSTRUCTIONS
DISCHARGE DIAGNOSIS:  EColi bacteremia   Bilateral hydronephrosis s/p cystoscopy and stent placement  Complicated UTI  Severe sepsis  -aJnice   Creat trending down  Hypothyroidism  Dyslipidemia      MEDICATIONS:  It is important that you take the medication exactly as they are prescribed. Keep your medication in the bottles provided by the pharmacist and keep a list of the medication names, dosages, and times to be taken in your wallet. Do not take other medications without consulting your doctor. Pain Management: per above medications    What to do at Home    Recommended diet:  Regular Diet    Recommended activity: Activity as tolerated    If you have questions regarding the hospital related prescriptions or hospital related issues please call [place] [address] at . You can always direct your questions to your primary care doctor if you are unable to reach your hospital physician; your PCP works as an extension of your hospital doctor just like your hospital doctor is an extension of your PCP for your time at the hospital [facility]).     If you experience any of the following symptoms then please call your primary care physician or return to the emergency room if you cannot get hold of your doctor:  Fever, chills, nausea, vomiting, diarrhea, change in mentation, falling, bleeding, shortness of breath

## 2022-10-22 NOTE — PROGRESS NOTES
DISCHARGE NOTE FROM [facility] NURSE    Patient determined to be stable for discharge by attending provider. I have reviewed the discharge instructions and follow-up appointments with the patient. They verbalized understanding and all questions were answered to their satisfaction. No complaints or further questions were expressed. Medications sent to pharmacy. Appropriate educational materials and medication side effect teaching were provided. PIV were removed prior to discharge. Patient did not discharge with any line, sanchez, or drain. All personal items collected during admission were returned to the patient prior to discharge. Post-op patient: No    Pt collected all belongings and transported via w/c to [facility] for discharge.   Joyice Libman, RN

## 2022-10-23 LAB
BACTERIA SPEC CULT: ABNORMAL
BACTERIA SPEC CULT: ABNORMAL
SERVICE CMNT-IMP: ABNORMAL

## 2022-10-28 LAB
BACTERIA SPEC CULT: NORMAL
SERVICE CMNT-IMP: NORMAL

## 2022-11-23 NOTE — OP NOTES
[address]  OPERATIVE REPORT    Name:  Mily Cintron  MR#:  [id number]  :  1945  ACCOUNT #:  [de-identified]  DATE OF SERVICE:  10/19/2022    PREOPERATIVE DIAGNOSIS:  Bilateral kidney stones. POSTOPERATIVE DIAGNOSIS:  Bilateral kidney stones, with infection. PROCEDURE PERFORMED:  Cystoscopy with bilateral stent placement. SURGEON:  Wale Higginbotham MD    ASSISTANT:  None. ANESTHESIA:  General.    COMPLICATIONS:  None. SPECIMENS REMOVED:  None. IMPLANTS:  None. ESTIMATED BLOOD LOSS:  Minimal.    DRAINS:  None. FINDINGS:  Bilateral ureteral stents placed 6 x 24. Purulent discharge greater in left than right. DESCRIPTION OF THE PROCEDURE IN DETAIL:  After informed consent was obtained, the patient was brought back to the operating room and placed in supine position where general anesthesia was undertaken. She was then prepped and draped in dorsal lithotomy position. A proper time-out was performed including confirmation of preoperative antibiotics. To begin the procedure, a standard cystourethroscopy was performed. There were no abnormalities located within the urinary bladder. Attention was then turned to the left ureteral orifice which was cannulated with a Sensor wire up to the level of the kidney. Purulent discharge was visualized coming out after the wire had past the obstruction. At this time, a Sensor wire was then placed on the right up to the level of the kidney. There was purulent discharge,; however, less than in the contralateral side. It was at this time that we were able to place 6 x 24 double-J ureteral stent under fluoroscopic guidance with good curl noted in the kidney as well as in the urinary bladder on the left followed by on the right in the same fashion. There were good curls noted in the kidney as well as in the urinary bladder.   The patient's bladder was emptied; however, a Pa catheter was left at the remainder of the case.  All the strings were removed prior to placing the catheter. It was at this time the patient was awoken in the operating room in stable condition without complication. DISPOSITION:  The patient will return back to the urology clinic pending stable postoperative course and appropriate antibiotics for definitive management.       MD KEITH [id number]  D:  11/22/2022 20:40  T:  11/23/2022 2:55  JOB #:  [id number]

## (undated) DEVICE — CANISTER, RIGID, 3000CC: Brand: MEDLINE INDUSTRIES, INC.

## (undated) DEVICE — TUBING, SUCTION, 1/4" X 10', STRAIGHT: Brand: MEDLINE

## (undated) DEVICE — SOLUTION IRRIG 1000ML STRL H2O USP PLAS POUR BTL

## (undated) DEVICE — OPEN-END URETERAL CATHETER: Brand: COOK

## (undated) DEVICE — SOLUTION IRRIGATION H2O 0797305] ICU MEDICAL INC]

## (undated) DEVICE — GLOVE ORANGE PI 7 1/2   MSG9075

## (undated) DEVICE — GDWIRE UROL STR 150CM FLX TP -- BX/5 SENSOR

## (undated) DEVICE — TUBING IRRIG L77IN DIA0.241IN L BOR FOR CYSTO W/ NVENT

## (undated) DEVICE — CYSTO MRMC: Brand: MEDLINE INDUSTRIES, INC.

## (undated) DEVICE — SOLUTION IRRIG 3000ML 0.9% SOD CHL USP UROMATIC PLAS CONT

## (undated) DEVICE — MARKER,SKIN,WI/RULER AND LABELS: Brand: MEDLINE